# Patient Record
Sex: MALE | Race: BLACK OR AFRICAN AMERICAN | Employment: UNEMPLOYED | URBAN - METROPOLITAN AREA
[De-identification: names, ages, dates, MRNs, and addresses within clinical notes are randomized per-mention and may not be internally consistent; named-entity substitution may affect disease eponyms.]

---

## 2017-03-10 ENCOUNTER — APPOINTMENT (OUTPATIENT)
Dept: INFECTIOUS DISEASES | Facility: CLINIC | Age: 47
End: 2017-03-10
Attending: INTERNAL MEDICINE
Payer: COMMERCIAL

## 2017-03-30 DIAGNOSIS — Z21 ASYMPTOMATIC HUMAN IMMUNODEFICIENCY VIRUS (HIV) INFECTION STATUS (H): ICD-10-CM

## 2017-03-30 RX ORDER — EFAVIRENZ, EMTRICITABINE, AND TENOFOVIR DISOPROXIL FUMARATE 600; 200; 300 MG/1; MG/1; MG/1
TABLET, FILM COATED ORAL
Qty: 30 TABLET | Refills: 5 | Status: SHIPPED | OUTPATIENT
Start: 2017-03-30 | End: 2017-09-25

## 2017-09-20 ENCOUNTER — OFFICE VISIT (OUTPATIENT)
Dept: INFECTIOUS DISEASES | Facility: CLINIC | Age: 47
End: 2017-09-20
Attending: INTERNAL MEDICINE
Payer: COMMERCIAL

## 2017-09-20 VITALS
HEIGHT: 68 IN | DIASTOLIC BLOOD PRESSURE: 74 MMHG | WEIGHT: 188.4 LBS | SYSTOLIC BLOOD PRESSURE: 138 MMHG | TEMPERATURE: 98.7 F | BODY MASS INDEX: 28.55 KG/M2 | HEART RATE: 94 BPM

## 2017-09-20 DIAGNOSIS — L05.91 INFECTED PILONIDAL CYST: ICD-10-CM

## 2017-09-20 DIAGNOSIS — K64.8 INTERNAL HEMORRHOID: ICD-10-CM

## 2017-09-20 DIAGNOSIS — B20 HUMAN IMMUNODEFICIENCY VIRUS (HIV) DISEASE (H): Primary | ICD-10-CM

## 2017-09-20 DIAGNOSIS — Z00.00 HEALTHCARE MAINTENANCE: ICD-10-CM

## 2017-09-20 DIAGNOSIS — B20 HUMAN IMMUNODEFICIENCY VIRUS (HIV) DISEASE (H): ICD-10-CM

## 2017-09-20 DIAGNOSIS — B35.6 TINEA CRURIS: ICD-10-CM

## 2017-09-20 LAB
ALBUMIN SERPL-MCNC: 4.2 G/DL (ref 3.4–5)
ALBUMIN UR-MCNC: NEGATIVE MG/DL
ALP SERPL-CCNC: 99 U/L (ref 40–150)
ALT SERPL W P-5'-P-CCNC: 34 U/L (ref 0–70)
ANION GAP SERPL CALCULATED.3IONS-SCNC: 6 MMOL/L (ref 3–14)
APPEARANCE UR: CLEAR
AST SERPL W P-5'-P-CCNC: 23 U/L (ref 0–45)
BASOPHILS # BLD AUTO: 0 10E9/L (ref 0–0.2)
BASOPHILS NFR BLD AUTO: 0.4 %
BILIRUB SERPL-MCNC: 0.2 MG/DL (ref 0.2–1.3)
BILIRUB UR QL STRIP: NEGATIVE
BUN SERPL-MCNC: 14 MG/DL (ref 7–30)
CALCIUM SERPL-MCNC: 9 MG/DL (ref 8.5–10.1)
CHLORIDE SERPL-SCNC: 106 MMOL/L (ref 94–109)
CO2 SERPL-SCNC: 28 MMOL/L (ref 20–32)
COLOR UR AUTO: YELLOW
CREAT SERPL-MCNC: 0.78 MG/DL (ref 0.66–1.25)
DIFFERENTIAL METHOD BLD: NORMAL
EOSINOPHIL # BLD AUTO: 0.1 10E9/L (ref 0–0.7)
EOSINOPHIL NFR BLD AUTO: 1.1 %
ERYTHROCYTE [DISTWIDTH] IN BLOOD BY AUTOMATED COUNT: 12.6 % (ref 10–15)
GFR SERPL CREATININE-BSD FRML MDRD: >90 ML/MIN/1.7M2
GLUCOSE SERPL-MCNC: 89 MG/DL (ref 70–99)
GLUCOSE UR STRIP-MCNC: NEGATIVE MG/DL
HCT VFR BLD AUTO: 43 % (ref 40–53)
HGB BLD-MCNC: 14.2 G/DL (ref 13.3–17.7)
HGB UR QL STRIP: NEGATIVE
IMM GRANULOCYTES # BLD: 0 10E9/L (ref 0–0.4)
IMM GRANULOCYTES NFR BLD: 0.1 %
KETONES UR STRIP-MCNC: NEGATIVE MG/DL
LEUKOCYTE ESTERASE UR QL STRIP: NEGATIVE
LIPASE SERPL-CCNC: 122 U/L (ref 73–393)
LYMPHOCYTES # BLD AUTO: 3.6 10E9/L (ref 0.8–5.3)
LYMPHOCYTES NFR BLD AUTO: 44.8 %
MCH RBC QN AUTO: 31.1 PG (ref 26.5–33)
MCHC RBC AUTO-ENTMCNC: 33 G/DL (ref 31.5–36.5)
MCV RBC AUTO: 94 FL (ref 78–100)
MONOCYTES # BLD AUTO: 0.6 10E9/L (ref 0–1.3)
MONOCYTES NFR BLD AUTO: 7.2 %
MUCOUS THREADS #/AREA URNS LPF: PRESENT /LPF
NEUTROPHILS # BLD AUTO: 3.7 10E9/L (ref 1.6–8.3)
NEUTROPHILS NFR BLD AUTO: 46.4 %
NITRATE UR QL: NEGATIVE
NRBC # BLD AUTO: 0 10*3/UL
NRBC BLD AUTO-RTO: 0 /100
PH UR STRIP: 5 PH (ref 5–7)
PLATELET # BLD AUTO: 228 10E9/L (ref 150–450)
POTASSIUM SERPL-SCNC: 3.8 MMOL/L (ref 3.4–5.3)
PROT SERPL-MCNC: 8.5 G/DL (ref 6.8–8.8)
RBC # BLD AUTO: 4.56 10E12/L (ref 4.4–5.9)
RBC #/AREA URNS AUTO: 11 /HPF (ref 0–2)
SODIUM SERPL-SCNC: 140 MMOL/L (ref 133–144)
SOURCE: ABNORMAL
SP GR UR STRIP: 1.03 (ref 1–1.03)
SQUAMOUS #/AREA URNS AUTO: <1 /HPF (ref 0–1)
UROBILINOGEN UR STRIP-MCNC: 0 MG/DL (ref 0–2)
WBC # BLD AUTO: 7.9 10E9/L (ref 4–11)
WBC #/AREA URNS AUTO: 2 /HPF (ref 0–2)

## 2017-09-20 PROCEDURE — 87491 CHLMYD TRACH DNA AMP PROBE: CPT | Performed by: INTERNAL MEDICINE

## 2017-09-20 PROCEDURE — 86359 T CELLS TOTAL COUNT: CPT | Performed by: INTERNAL MEDICINE

## 2017-09-20 PROCEDURE — 85025 COMPLETE CBC W/AUTO DIFF WBC: CPT

## 2017-09-20 PROCEDURE — 87591 N.GONORRHOEAE DNA AMP PROB: CPT | Performed by: INTERNAL MEDICINE

## 2017-09-20 PROCEDURE — 80053 COMPREHEN METABOLIC PANEL: CPT

## 2017-09-20 PROCEDURE — 87536 HIV-1 QUANT&REVRSE TRNSCRPJ: CPT | Performed by: INTERNAL MEDICINE

## 2017-09-20 PROCEDURE — 86359 T CELLS TOTAL COUNT: CPT

## 2017-09-20 PROCEDURE — 83690 ASSAY OF LIPASE: CPT

## 2017-09-20 PROCEDURE — 99213 OFFICE O/P EST LOW 20 MIN: CPT | Mod: ZF

## 2017-09-20 PROCEDURE — 81001 URINALYSIS AUTO W/SCOPE: CPT

## 2017-09-20 PROCEDURE — 86360 T CELL ABSOLUTE COUNT/RATIO: CPT | Performed by: INTERNAL MEDICINE

## 2017-09-20 RX ORDER — SULFAMETHOXAZOLE/TRIMETHOPRIM 800-160 MG
1 TABLET ORAL 2 TIMES DAILY
Qty: 14 TABLET | Refills: 0 | Status: SHIPPED | OUTPATIENT
Start: 2017-09-20 | End: 2018-05-30

## 2017-09-20 ASSESSMENT — PAIN SCALES - GENERAL: PAINLEVEL: NO PAIN (0)

## 2017-09-20 NOTE — MR AVS SNAPSHOT
"              After Visit Summary   9/20/2017    Viral Guerin    MRN: [id number]           Patient Information     Date Of Birth          1970        Visit Information        Provider Department      9/20/2017 5:00 PM Phuc Higgins MD M [facility] and Infectious Diseases        Today's Diagnoses     Human immunodeficiency virus (HIV) disease (H)    -  1    Infected pilonidal cyst          Care Instructions    - For the bright red blood in your stool, I suspect you have a mild internal rectal abrasion or an early internal hemorrhoid from constipation.  Take Metamucil (or generic version) twice daily along with lots of fluid to increase your stool frequency and fiber content to reduce the constipation and rectal irritation.  - Let me know if the blood in the stools persists more than a couple of weeks.  - For the groin rash (\"jock itch\", tinea cruris), use over-the-counter antifungal powder (clotrimazole or miconazole) once or twice daily as needed.  For the infected pilonidal cyst on your buttocks, take the Bactrim (trimethoprim-sulfamethoxazole) antibiotic one pill twice daily for a week.  If the cyst returns again later, then you may need to go to the Rectal Surgery Clinic to have it excised.  - Continue to work on the slow weight loss -- way to go!          Follow-ups after your visit        Follow-up notes from your care team     Return in about 1 year (around 9/20/2018).      Future tests that were ordered for you today     Open Future Orders        Priority Expected Expires Ordered    Routine UA with micro - Reflex to culture Routine  9/20/2018 9/20/2017    Comprehensive metabolic panel Routine  9/20/2018 9/20/2017    CBC with platelets differential Routine  9/20/2018 9/20/2017    HIV-1 RNA quantitative Routine  9/20/2018 9/20/2017    T cell subset profile Routine  9/20/2018 9/20/2017    Chlamydia trachomatis PCR Routine  9/20/2018 9/20/2017    Neisseria gonorrhoeae PCR Routine  9/20/2018 " "2017    Lipase Routine  2018            Who to contact     If you have questions or need follow up information about today's clinic visit or your schedule please contact [facility] INFECTI[facility] directly at [phone].  Normal or non-critical lab and imaging results will be communicated to you by MyChart, letter or phone within 4 business days after the clinic has received the results. If you do not hear from us within 7 days, please contact the clinic through MyChart or phone. If you have a critical or abnormal lab result, we will notify you by phone as soon as possible.  Submit refill requests through [employer] or call your pharmacy and they will forward the refill request to us. Please allow 3 business days for your refill to be completed.          Additional Information About Your Visit        MyChart Information     [employer] lets you send messages to your doctor, view your test results, renew your prescriptions, schedule appointments and more. To sign up, go to [email or web address] . Click on \"Log in\" on the left side of the screen, which will take you to the Welcome page. Then click on \"Sign up Now\" on the right side of the page.     You will be asked to enter the access code listed below, as well as some personal information. Please follow the directions to create your username and password.     Your access code is: [email or web address]  Expires: 2017  6:30 AM     Your access code will  in 90 days. If you need help or a new code, please call your [facility] clinic or [phone].        Care EveryWhere ID     This is your Care EveryWhere ID. This could be used by other organizations to access your [facility] medical records  [id number]        Your Vitals Were     Pulse Temperature Height BMI (Body Mass Index)          94 98.7  F (37.1  C) (Oral) 1.715 m (5' 7.5\") 29.07 kg/m2         Blood Pressure from Last 3 Encounters:   17 138/74   16 116/76 "   05/16/16 119/76    Weight from Last 3 Encounters:   09/20/17 85.5 kg (188 lb 6.4 oz)   11/16/16 90.9 kg (200 lb 6.4 oz)   05/16/16 89.3 kg (196 lb 14.4 oz)                 Today's Medication Changes          These changes are accurate as of: 9/20/17  6:03 PM.  If you have any questions, ask your nurse or doctor.               Start taking these medicines.        Dose/Directions    sulfamethoxazole-trimethoprim 800-160 MG per tablet   Commonly known as:  BACTRIM DS   Used for:  Infected pilonidal cyst   Started by:  Phuc Higgins MD        Dose:  1 tablet   Take 1 tablet by mouth 2 times daily   Quantity:  14 tablet   Refills:  0         Stop taking these medicines if you haven't already. Please contact your care team if you have questions.     diphenhydrAMINE 25 MG capsule   Commonly known as:  BENADRYL   Stopped by:  Phuc Higgins MD                Where to get your medicines      These medications were sent to [address]  [address] [place] [address] [address] [place] [id number]    Hours:  TRANSPLANT PHONE NUMBER [phone] Phone:  [phone]     sulfamethoxazole-trimethoprim 800-160 MG per tablet                Primary Care Provider    None Doctor, MD       No address on file        Equal Access to Services     CANDI GOINS AH: Hadii mauricio cuevas hadasho Soomaali, waaxda luqadaha, qaybta kaalmada adeegyada, heidy chamberlain haylaura welsh. So [facility] [phone].    ATENCIÓN: Si habla español, tiene a celis disposición servicios gratuitos de asistencia lingüística. Llame al [phone].    We comply with applicable federal civil rights laws and [place] laws. We do not discriminate on the basis of race, color, national origin, age, disability sex, sexual orientation or gender identity.            Thank you!     Thank you for choosing [facility] INFECT[facility]  for your care. Our goal is always to provide you with  excellent care. Hearing back from our patients is one way we can continue to improve our services. Please take a few minutes to complete the written survey that you may receive in the mail after your visit with us. Thank you!             Your Updated Medication List - Protect others around you: Learn how to safely use, store and throw away your medicines at www.disposemymeds.org.          This list is accurate as of: 9/20/17  6:03 PM.  Always use your most recent med list.                   Brand Name Dispense Instructions for use Diagnosis    ATRIPLA 600-200-300 MG per tablet   Generic drug:  efavirenz-emtrictabine-tenofovir     30 tablet    TAKE ONE TABLET BY MOUTH AT BEDTIME.    Asymptomatic human immunodeficiency virus (HIV) infection status (H)       ONE-A-DAY MENS Tabs      Take  by mouth daily.    Human immunodeficiency virus (HIV) disease (H)       sulfamethoxazole-trimethoprim 800-160 MG per tablet    BACTRIM DS    14 tablet    Take 1 tablet by mouth 2 times daily    Infected pilonidal cyst

## 2017-09-20 NOTE — NURSING NOTE
"Chief Complaint   Patient presents with     RECHECK     follow up with B20, tzimmer cma       Initial /74  Pulse 94  Temp 98.7  F (37.1  C) (Oral)  Ht 1.715 m (5' 7.5\")  Wt 85.5 kg (188 lb 6.4 oz)  BMI 29.07 kg/m2 Estimated body mass index is 29.07 kg/(m^2) as calculated from the following:    Height as of this encounter: 1.715 m (5' 7.5\").    Weight as of this encounter: 85.5 kg (188 lb 6.4 oz).  Medication Reconciliation: complete    "

## 2017-09-20 NOTE — PATIENT INSTRUCTIONS
"- For the bright red blood in your stool, I suspect you have a mild internal rectal abrasion or an early internal hemorrhoid from constipation.  Take Metamucil (or generic version) twice daily along with lots of fluid to increase your stool frequency and fiber content to reduce the constipation and rectal irritation.  - Let me know if the blood in the stools persists more than a couple of weeks.  - For the groin rash (\"jock itch\", tinea cruris), use over-the-counter antifungal powder (clotrimazole or miconazole) once or twice daily as needed.  For the infected pilonidal cyst on your buttocks, take the Bactrim (trimethoprim-sulfamethoxazole) antibiotic one pill twice daily for a week.  If the cyst returns again later, then you may need to go to the Rectal Surgery Clinic to have it excised.  - Continue to work on the slow weight loss -- way to go!  "

## 2017-09-21 LAB
C TRACH DNA SPEC QL NAA+PROBE: POSITIVE
CD3 CELLS # BLD: 2462 CELLS/UL (ref 603–2990)
CD3 CELLS NFR BLD: 64 % (ref 49–84)
CD3+CD4+ CELLS # BLD: 798 CELLS/UL (ref 441–2156)
CD3+CD4+ CELLS NFR BLD: 21 % (ref 28–63)
CD3+CD4+ CELLS/CD3+CD8+ CLL BLD: 0.5 % (ref 1.4–2.6)
CD3+CD8+ CELLS # BLD: 1605 CELLS/UL (ref 125–1312)
CD3+CD8+ CELLS NFR BLD: 42 % (ref 10–40)
IFC SPECIMEN: ABNORMAL
N GONORRHOEA DNA SPEC QL NAA+PROBE: NEGATIVE
SPECIMEN SOURCE: ABNORMAL
SPECIMEN SOURCE: NORMAL

## 2017-09-22 LAB
HIV1 RNA # PLAS NAA DL=20: <20 {COPIES}/ML
HIV1 RNA SERPL NAA+PROBE-LOG#: <1.3 {LOG_COPIES}/ML

## 2017-09-22 NOTE — PROGRESS NOTES
Submitted case report form to MD for chlamydia lab positive collected on 09/20/2017.  Santa Dawn 9/22/2017   [facility] Infection Prevention  [phone]

## 2017-09-25 DIAGNOSIS — Z21 ASYMPTOMATIC HUMAN IMMUNODEFICIENCY VIRUS (HIV) INFECTION STATUS (H): ICD-10-CM

## 2017-09-28 NOTE — PROGRESS NOTES
"  Division of Infectious Diseases and International Medicine  Department of Medicine    [facility] FOLLOW-UP VISIT NOTE [place] Mail [phone] [place] [address]  Office: [phone]  Fax:  [phone]     HISTORY OF PRESENT ILLNESS:    Viral Guerin is a very pleasant 46 year old gentleman with asymptomatic HIV infection (initially diagnosed in 6/10).  He returns today to [facility] for ~ annual follow-up of his antiretroviral therapy with Atripla one tablet PO qHS (started in 7/10).  He continues to be proud that he has not missed a single dose of Atripla since starting it in 2010.  He experiences no drug side effects.  He generally feels quite well.  He sleeps well at night and has good energy and appetite.  He continues to do fifty sit ups each morning, but still is not performing much in the way of other routine aerobic exercise.  He has managed to watch his diet and lose more than ten pounds, however.  His prior right axillary cyst resolved around the beginning of this year.  He is not aware of any STD exposures over the past year but is willing to undergo an STI screening (has engages only in insertive intercourse so does not feel he needs oral / rectal screening).  He has not smoked any cigarettes for the past ~ 1.5 years+.  He has noticed some scrotal / groin erythema and chafing of late.  He also complains that the previously noted left lower gluteal cleft tender lesion that was present off and on in 10 - 11/16 and resolved after a ten day course of TMP-SMX antibiotic therapy (for possible diagnosis of Staph aureus furuncle), but has now recurred again over the past two weeks.  About 1.5 weeks ago it was very swollen and tender to touch or sitting, but then seemed to \"pop\" open and drain a small amount of thin fluid and has since involuted some and is less tender.  He otherwise feels healthy of late and reports no recent fever, chills, or new night sweats " "(beyond baseline mild lifelong occasional sweats at night), rash, EENT symptoms, chest pain, dyspnea, cough, nausea, abdominal pain, diarrhea, myalgias / arthralgias, dysuria, other genitourinary symptoms, headache, or other neurological symptoms.    PAST MEDICAL HISTORY:  Past Medical History:   Diagnosis Date     Childhood asthma     Resolved.     Finger fracture, left ~ 1991.     Human immunodeficiency virus (HIV) disease (H) Diagnosed 6/11/10    CD4+ cell count never below 200, no AIDS sequelae.  Started therapy with Atripla 7/2/10.     CURRENT MEDICATIONS:  Current Outpatient Prescriptions   Medication Sig Dispense Refill     sulfamethoxazole-trimethoprim (BACTRIM DS) 800-160 MG per tablet Take 1 tablet by mouth 2 times daily 14 tablet 0     ATRIPLA 600-200-300 MG per tablet TAKE ONE TABLET BY MOUTH AT BEDTIME. 30 tablet 5     Multiple Vitamin (ONE-A-DAY MENS) TABS Take  by mouth daily.       SOCIAL HISTORY:  Social History     Social History     Marital status: Single     Spouse name: N/A     Number of children: N/A     Years of education: N/A     Occupational History     Not on file.     Social History Main Topics     Smoking status: Former Smoker     Packs/day: 0.30     Types: Cigarettes     Smokeless tobacco: Never Used     Alcohol use Yes      Comment: Wine daily.     Drug use: No     Sexual activity: Not on file     Other Topics Concern     Not on file     Social History Narrative    Citizen of [place], moved to [place] in early 2010.  Lives in [place].  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   Sexually active with a female partner and practices safe sex with a condom.  He performs push-ups for exercise each morning and evening.  He now also goes to a gym twice a week and [place] [facility] [place].  Quit smoking ~ early 2016.    FAMILY HISTORY:  Family History   Problem Relation Age of Onset     Hypertension Other      REVIEW OF " "SYMPTOMS:  As per HPI.  Complete ROS is otherwise negative.    PHYSICAL EXAMINATION:  General:  A personable, articulate, upbeat, WDWN 46 year old man in no discomfort.  Vital signs:  /74  Pulse 94  Temp 98.7  F (37.1  C) (Oral)  Ht 1.715 m (5' 7.5\")  Wt 85.5 kg (188 lb 6.4 oz)  BMI 29.07 kg/m2 (weight decreased twleve pounds versus 11/16).  Skin:  No new rash or lesion.  Head / Neck:  NCAT. External auditory canals are patent without otorrhea.  PERRL.  EOMI.  Conjunctivae are pink without injection.  Sclerae are white.  Oropharynx lacks erythema, exudate, or lesion.  Dentition is in good repair.  Neck is supple without lymphadenopathy or thyromegaly.  Lungs:  Bilaterally clear to auscultation.  CV: RRR.  Normal S1, S2 without gallop, murmur or rub.  Abdomen: Bowel sounds active, soft, nontender, no hepatosplenomegaly.  Back:  No low back or CVA tenderness.  :  Normal external male genitalia, testes descended bilaterally.  There is a mild diffuse scrotal and inguinal tinea cruris erythema with a few satellite lesions present bilaterally.  Rectal:  There is a small (~ 2 cm), involuted, healing (post-drainage) probable pilonidal cyst at about 7:00 below to anus in the left inferior approximating gluteal cleft that is mildly tender to palpation and slightly erythematous but lacks significant signs of inflammation.  There are no other rectal or perineal lesions visible.  Extremities:  Distally warm, no edema.  Neuro: Alert, oriented, memory/cognition/ affect normal.  Gait is normal.    LABORATORY STUDIES (Past results reviewed with the patient during his clinic appointment today):      Color Urine 09/20/2017 Yellow   Final     Appearance Urine 09/20/2017 Clear   Final     Glucose Urine 09/20/2017 Negative  NEG^Negative mg/dL Final     Bilirubin Urine 09/20/2017 Negative  NEG^Negative Final     Ketones Urine 09/20/2017 Negative  NEG^Negative mg/dL Final     Specific Gravity Urine 09/20/2017 1.033  1.003 - " 1.035 Final     Blood Urine 09/20/2017 Negative  NEG^Negative Final     pH Urine 09/20/2017 5.0  5.0 - 7.0 pH Final     Protein Albumin Urine 09/20/2017 Negative  NEG^Negative mg/dL Final     Urobilinogen mg/dL 09/20/2017 0.0  0.0 - 2.0 mg/dL Final     Nitrite Urine 09/20/2017 Negative  NEG^Negative Final     Leukocyte Esterase Urine 09/20/2017 Negative  NEG^Negative Final     Source 09/20/2017 Midstream Urine   Final     WBC Urine 09/20/2017 2  0 - 2 /HPF Final     RBC Urine 09/20/2017 11* 0 - 2 /HPF Final     Squamous Epithelial /HPF Urine 09/20/2017 <1  0 - 1 /HPF Final     Mucous Urine 09/20/2017 Present* NEG^Negative /LPF Final     Sodium 09/20/2017 140  133 - 144 mmol/L Final     Potassium 09/20/2017 3.8  3.4 - 5.3 mmol/L Final     Chloride 09/20/2017 106  94 - 109 mmol/L Final     Carbon Dioxide 09/20/2017 28  20 - 32 mmol/L Final     Anion Gap 09/20/2017 6  3 - 14 mmol/L Final     Glucose 09/20/2017 89  70 - 99 mg/dL Final     Urea Nitrogen 09/20/2017 14  7 - 30 mg/dL Final     Creatinine 09/20/2017 0.78  0.66 - 1.25 mg/dL Final     GFR Estimate 09/20/2017 >90  >60 mL/min/1.7m2 Final    Non  GFR Calc     GFR Estimate If Black 09/20/2017 >90  >60 mL/min/1.7m2 Final    African American GFR Calc     Calcium 09/20/2017 9.0  8.5 - 10.1 mg/dL Final     Bilirubin Total 09/20/2017 0.2  0.2 - 1.3 mg/dL Final     Albumin 09/20/2017 4.2  3.4 - 5.0 g/dL Final     Protein Total 09/20/2017 8.5  6.8 - 8.8 g/dL Final     Alkaline Phosphatase 09/20/2017 99  40 - 150 U/L Final     ALT 09/20/2017 34  0 - 70 U/L Final     AST 09/20/2017 23  0 - 45 U/L Final     WBC 09/20/2017 7.9  4.0 - 11.0 10e9/L Final     RBC Count 09/20/2017 4.56  4.4 - 5.9 10e12/L Final     Hemoglobin 09/20/2017 14.2  13.3 - 17.7 g/dL Final     Hematocrit 09/20/2017 43.0  40.0 - 53.0 % Final     MCV 09/20/2017 94  78 - 100 fl Final     MCH 09/20/2017 31.1  26.5 - 33.0 pg Final     MCHC 09/20/2017 33.0  31.5 - 36.5 g/dL Final     RDW  09/20/2017 12.6  10.0 - 15.0 % Final     Platelet Count 09/20/2017 228  150 - 450 10e9/L Final     Diff Method 09/20/2017 Automated Method   Final     % Neutrophils 09/20/2017 46.4  % Final     % Lymphocytes 09/20/2017 44.8  % Final     % Monocytes 09/20/2017 7.2  % Final     % Eosinophils 09/20/2017 1.1  % Final     % Basophils 09/20/2017 0.4  % Final     % Immature Granulocytes 09/20/2017 0.1  % Final     Nucleated RBCs 09/20/2017 0  0 /100 Final     Absolute Neutrophil 09/20/2017 3.7  1.6 - 8.3 10e9/L Final     Absolute Lymphocytes 09/20/2017 3.6  0.8 - 5.3 10e9/L Final     Absolute Monocytes 09/20/2017 0.6  0.0 - 1.3 10e9/L Final     Absolute Eosinophils 09/20/2017 0.1  0.0 - 0.7 10e9/L Final     Absolute Basophils 09/20/2017 0.0  0.0 - 0.2 10e9/L Final     Abs Immature Granulocytes 09/20/2017 0.0  0 - 0.4 10e9/L Final     Absolute Nucleated RBC 09/20/2017 0.0   Final     HIV-1 RNA Quant Result 09/20/2017 <20* HIVND^HIV-1 RNA Not Detected [Copies]/mL Final    Comment: HIV-1 RNA Detected, less than 20 HIV-1 RNA copies/mL  The [employer] AmpliPrep/ERIKA TaqMan HIV-1 test is an FDA-approved in vitro   nucleic acid amplification test for the quantitation of HIV-1 RNA in human   plasma (EDTA plasma) using the [employer]KA AmpliPrep instrument for automated viral   nucleic acid extraction and the E[employer]KA TaqMan Analyzer or ERIKA TaqMan for   automated Real Time PCR amplification and detection of the viral nucleic acid   target.  Titer results are reported in copies/ml. This assay is intended for use in   conjunction with clinical presentation and other laboratory markers of disease   prognosis and for use as an aid in assessing viral response to antiretroviral   treatment as measured by changes in plasma HIV-1 RNA levels. This test should   not be used as a donor screening test to confirm the presence of HIV-1   infection.       HIV RNA QT Log 09/20/2017 <1.3  <1.3 [Log_copies]/mL Final     IFC Specimen 09/20/2017 Blood   Final      CD3 Mature T 09/20/2017 64  49 - 84 % Final     CD4 [place] T 09/20/2017 21* 28 - 63 % Final     CD8 Suppressor T 09/20/2017 42* 10 - 40 % Final     CD4:CD8 Ratio 09/20/2017 0.50* 1.40 - 2.60 Final     Absolute CD3 09/20/2017 2462  603 - 2990 cells/uL Final     Absolute CD4 09/20/2017 798  441 - 2156 cells/uL Final     Absolute CD8 09/20/2017 1605* 125 - 1312 cells/uL Final     Specimen Description 09/20/2017 Urine   Final     Chlamydia Trachomatis PCR 09/20/2017 Positive* NEG^Negative Final    Comment: Positive for C. trachomatis rRNA by transcription mediated amplification.  As is true for all non-culture methods, a positive specimen obtained from a   patient after therapeutic treatment cannot be interpreted as indicating the   presence of viable C. trachomatis.  Significant Value faxed/printed to  [facility] [phone] AT 1215 ON 09.21.17 ETK.       Specimen Descrip 09/20/2017 Urine   Final     N Gonorrhea PCR 09/20/2017 Negative  NEG^Negative Final    Comment: Negative for N. gonorrhoeae rRNA by transcription mediated amplification.  A negative result by transcription mediated amplification does not preclude   the presence of N. gonorrhoeae infection because results are dependent on   proper and adequate collection, absence of inhibitors, and sufficient rRNA to   be detected.       Lipase 09/20/2017 122  73 - 393 U/L Final     6/28/11:  Fasting total cholesterol 164, triglycerides 180, LDL 88, HDL 40.  HAV / ABV / HCV seronegative, antitreponemal antibody negative, toxoplasmosis IgG negative.  6/17/10  HIV susceptibility Rafiugene genotype (protocol screening) showed no primary reverse transcriptase or protease mutations.   6/11/10 HIV EIA screen and Western blot (all bands reactive) positive at a [facility].        IMPRESSION AND PLAN:  1. Asymptomatic HIV infection:  Stable on continued Atripla, which he takes and tolerates exceptionally well, his absolute CD4+ cell count is now consistently  above 700 over the past two years with a persistently undetectable HIV plasma viral load.  We discussed that other antiretroviral combination pills are available, but he is content with Atripla and has no real reason to switch.  His serum creatinine remains normal.  He will continue taking Atripla and return to clinic for follow-up in one year, or as needed before then.  2. Drained, involuted, probable left pilonidal cyst:  This tender lesion occurred in exactly the same location in ~ 10/16 and was treated as a possible Staph aureus furuncle with a ten day course of TMP-SMX on 11/16/16 with resolution, but now has recurred.  This time, it came to a head, burst, and self-drained about a week+ ago, but still has some mild inflammation at the site.  This history and exam is now strongly suggestive of a pilonidal cyst -- we discussed that this may require surgery for definitive cure.  Since it resolved and stayed resolved for nearly a year after an antibiotic course in 11/16, Mr. Guerin is reluctant to take a referral to Surgery Clinic at this time -- he wants to see if a course of antibiotics once again resolves the problem, even though I advised him that it is eventually likely to return to a very painful state sooner or late.  In keeping with his wishes, we will again treat now with a course of TMP-SMX DS one PO BID for a week.  If the lesion recurs after that, he agrees to phone me an then accept a referral to Colorectal Clinic.  3. Probable internal hemorrhoid:  He has had mild painless stool-spotting BRBPR.  Recommended that he increase fiber (dietary and Metamucil or equivalent) and fluid intake.  He should phone me if the bleeding does not fully resolve within a couple of weeks.  4. Tinea cruris:  I recommended treatment with topical OTC clotrimazole 1% powder BID for 1 - 2 weeks as well as more air exposure.  5. Obesity:  Improving slowly.  Recommended routine aerobic exercise.  6. History of past urinary  Chlamydia infection:  Treated with azithromycin / IM ceftriaxone here on 6/22/15; subsequent follow-up test-of-cure assays were negative.  Has not had symptoms and he reports no likely re-exposures (although in 6/15 he was puzzled how he might have been exposed, so perhaps was having dissociative sexual encounters), but he is due today for repeat STD screening.  He has only insertive intercourse, so rectal screening is unneeded.  7. Health Care Maintenance:   Influenza vaccine given 11/16/16 -- will return for that later this autumn (not yet available in clinic today).  Menactra vaccine given 11/25/15.  Received Tdap 2/12/14.  Received a Prevnar 13 vaccine 2/12/14 and Pneumovax on 8/13/14.  HAV / HBV sero-immune on 6/18/15 post-vaccination.  HCV seronegative 6/28/11 and 5/16/16.  3/21/12 Quantiferon Gold assay negative.  Antitreponemal antibody screen negative most recently on 5/16/16 -- repeat at next blood draw.  7/18/12 fasting lipids were good with an LDL of 58 -- repeat at next blood draw.  Will check a routine urinalysis today.  Underwent a right lower molar dental extraction in ~ 4/15; up to date with a dentist.  No cigarettes smoked for > 1.5 years now.

## 2017-09-29 RX ORDER — EFAVIRENZ, EMTRICITABINE, AND TENOFOVIR DISOPROXIL FUMARATE 600; 200; 300 MG/1; MG/1; MG/1
TABLET, FILM COATED ORAL
Qty: 30 TABLET | Refills: 11 | Status: SHIPPED | OUTPATIENT
Start: 2017-09-29 | End: 2018-06-12 | Stop reason: ALTCHOICE

## 2017-10-03 DIAGNOSIS — Z20.2 EXPOSURE TO STD: ICD-10-CM

## 2017-10-03 DIAGNOSIS — A74.9 CHLAMYDIA INFECTION: Primary | ICD-10-CM

## 2017-10-03 RX ORDER — AZITHROMYCIN 500 MG/1
1000 TABLET, FILM COATED ORAL ONCE
Qty: 2 TABLET | Refills: 0 | Status: SHIPPED | OUTPATIENT
Start: 2017-10-03 | End: 2017-10-03

## 2017-10-03 NOTE — PROGRESS NOTES
"I phoned Mr. Guerin today to inform him that his 9/20/17 routine urine STD screening assay was again positive for Chlamydia (which he had previously once in 2015).  He is uncertain of whether / how he could have been re-exposed.  His GC screen was negative.  We will treat with azithromycin 1 gram PO x 1 -- he will come in to  the prescription at the [facility] within the next two days.  He will return for a \"test-of-cure\" urine STD screen in two to three months (ordered today).  I reported to him that the remainder of his 9/20/17 labs were excellent.  "

## 2018-01-22 ENCOUNTER — TELEPHONE (OUTPATIENT)
Dept: PHARMACY | Facility: CLINIC | Age: 48
End: 2018-01-22

## 2018-01-22 NOTE — TELEPHONE ENCOUNTER
Called patient for refill reminder.    Will mail prescriptions address has been verified.   Delicia Laureano, [facility] Pharmacist.   [phone]

## 2018-02-23 ENCOUNTER — TELEPHONE (OUTPATIENT)
Dept: PHARMACY | Facility: CLINIC | Age: 48
End: 2018-02-23

## 2018-02-23 NOTE — TELEPHONE ENCOUNTER
Called patient for refill reminder.    Will mail 1 prescriptions address has been verified.     Follow up: 03/21/18    Marlene Calzada, [facility]  [phone]

## 2018-03-23 ENCOUNTER — TELEPHONE (OUTPATIENT)
Dept: PHARMACY | Facility: CLINIC | Age: 48
End: 2018-03-23

## 2018-03-23 NOTE — TELEPHONE ENCOUNTER
Called patient for refill reminder.    Will mail 1 prescriptions address has been verified.     6 month of on time refill.    Follow up: 04/23/18    Marlene Calzada, [facility]  [phone]

## 2018-03-23 NOTE — TELEPHONE ENCOUNTER
"Clinical Consult:                                                    Viral Guerin is a 47 year old male called for a refill reminder on medication    We discussed \"new improved tenofovir AF\" and benefits for safety profile on kidney and bone. (Odefsy). Pt is interested in talking further to Dr. Higgins about this. Pt reports he has never missed one dose of Atripla since starting. Reports he is interested in the injectable HIV medication when it becomes available.       Plan:  1. Will mail out Atripla refill today.   2. Will have a  call pt for appt with Dr. Higgins.       Pt is asked to call with any questions/concerns,    Delicia Laureano, [facility] Pharmacist.   [phone]      "

## 2018-03-27 ENCOUNTER — TELEPHONE (OUTPATIENT)
Dept: PHARMACY | Facility: CLINIC | Age: 48
End: 2018-03-27

## 2018-03-27 NOTE — TELEPHONE ENCOUNTER
Called patient for refill reminder.    Patient will   1 prescriptions on 03/27/18    6 month of on time refill.    Follow up: 04/24/18    Marlene Calzada, [facility]  [phone]

## 2018-04-16 ENCOUNTER — ALLIED HEALTH/NURSE VISIT (OUTPATIENT)
Dept: INFECTIOUS DISEASES | Facility: CLINIC | Age: 48
End: 2018-04-16
Payer: COMMERCIAL

## 2018-04-16 DIAGNOSIS — J11.1 INFLUENZA-LIKE ILLNESS: Primary | ICD-10-CM

## 2018-04-16 DIAGNOSIS — J11.1 INFLUENZA-LIKE ILLNESS: ICD-10-CM

## 2018-04-16 DIAGNOSIS — R06.2 WHEEZING: ICD-10-CM

## 2018-04-16 LAB
FLUAV+FLUBV RNA SPEC QL NAA+PROBE: NEGATIVE
FLUAV+FLUBV RNA SPEC QL NAA+PROBE: NEGATIVE
RSV RNA SPEC NAA+PROBE: NEGATIVE
SPECIMEN SOURCE: NORMAL

## 2018-04-16 PROCEDURE — 87631 RESP VIRUS 3-5 TARGETS: CPT | Performed by: INTERNAL MEDICINE

## 2018-04-16 RX ORDER — ALBUTEROL SULFATE 90 UG/1
2 AEROSOL, METERED RESPIRATORY (INHALATION) EVERY 4 HOURS PRN
Qty: 1 INHALER | Refills: 0 | Status: SHIPPED | OUTPATIENT
Start: 2018-04-16 | End: 2021-10-27

## 2018-04-16 NOTE — PROGRESS NOTES
[facility] MD Note:  Mr. Guerin walked in to the [facility] this late morning without an appointment, hoping he could be seen for an acute condition.  He is prone to health anxiety but has been generally quite healthy over recent years and has not needed any follow-up or interventions since his most recent past 9/17/18 routine clinic appointment with me.  He says he has been healthy until yesterday.  Last evening, he developed acute fever (101 degrees) with chills, diffuse arthralgias, dry cough, mild audible wheezing (which he has been prone to in the distant past with URIs), mild intermittent holocranial headaches, and general malaise, all developing over the past ~ 12 hours.  He has some mild rhinorrhea, but lacks sinus pressure, otalgia, sore throat, other EENT symptoms, neck pain or stiffness, resting dyspnea, nausea, emesis, diarrhea, abdominal pain, rash or other skin changes, dysuria, or other new symptoms (as yet).  Per the clinic staff, he does not appear toxic and is fully ambulatory and conversant.    I am unable to get over to the clinic to see him in person now, but spoke with him by phone while he was in the clinic to get the above history.  I told him that, based on the above, he seems most likely to have an influenza-like illness which may be due to influenza or another acute community-acquired respiratory virus.  He has a normal absolute CD4+ cell count so has a basically normal immune capability to eventually fight off such an infection.  We will check a nares RSV / influenza PCR assay (ordered, done in clinic) and if it is positive, I will send a prescription for five days of oseltamivir to his local pharmacy.  In addition, he will resume using an albuterol inhaler QID for a few days as needed if that makes him feel better (presecription sent to our [facility]).  We discussed that this URI syndrome may get somewhat worse over the next couple of days before it  gets better, that it may last for up to a week, that he may take OTC acetaminophen / ibuprofen / cough syrup / cold-sleep aids as needed for discomfort and nocturnal cough suppression, that he should get plenty of rest and drink plenty of fluids and get as much nutrition as possible, and other home self-care measures.  He should phone us or be seen locally if his symptoms worsen dramatically.  He seemed to fully understand and appreciate this phone conversation.  He is encouraged to contact us with any additional questions or concerns.

## 2018-04-25 ENCOUNTER — TELEPHONE (OUTPATIENT)
Dept: PHARMACY | Facility: CLINIC | Age: 48
End: 2018-04-25

## 2018-04-25 NOTE — TELEPHONE ENCOUNTER
Pt called to order refill.   Patient will   1 prescriptions on 4/25/18    6 month of on time refill.    Follow-up Date: 05/23/18    Marlene Calzada [facility]  [phone]

## 2018-05-23 ENCOUNTER — TELEPHONE (OUTPATIENT)
Dept: PHARMACY | Facility: CLINIC | Age: 48
End: 2018-05-23

## 2018-05-23 NOTE — TELEPHONE ENCOUNTER
Called patient for refill reminder.    Patient will   1 prescriptions on 5/24/18 at [facility]    4 months of on time refill.    Follow-up Date: 6/20/18    Nelly Heard [facility]  [phone]

## 2018-05-29 ENCOUNTER — TELEPHONE (OUTPATIENT)
Dept: INFECTIOUS DISEASES | Facility: CLINIC | Age: 48
End: 2018-05-29

## 2018-05-29 NOTE — TELEPHONE ENCOUNTER
Spoke with pt via phone and confirmed appt with Dr. Higgins for tomorrow, 5/30.  Mya Aggarwal RN

## 2018-05-30 ENCOUNTER — OFFICE VISIT (OUTPATIENT)
Dept: INFECTIOUS DISEASES | Facility: CLINIC | Age: 48
End: 2018-05-30
Attending: INTERNAL MEDICINE
Payer: COMMERCIAL

## 2018-05-30 VITALS
BODY MASS INDEX: 27.58 KG/M2 | SYSTOLIC BLOOD PRESSURE: 126 MMHG | DIASTOLIC BLOOD PRESSURE: 77 MMHG | HEART RATE: 91 BPM | WEIGHT: 182 LBS | HEIGHT: 68 IN | TEMPERATURE: 98.3 F

## 2018-05-30 DIAGNOSIS — Z86.19 HISTORY OF CHLAMYDIA INFECTION: ICD-10-CM

## 2018-05-30 DIAGNOSIS — Z21 HUMAN IMMUNODEFICIENCY VIRUS I INFECTION (H): Primary | ICD-10-CM

## 2018-05-30 DIAGNOSIS — Z00.00 HEALTHCARE MAINTENANCE: ICD-10-CM

## 2018-05-30 DIAGNOSIS — B20 HUMAN IMMUNODEFICIENCY VIRUS (HIV) DISEASE (H): ICD-10-CM

## 2018-05-30 LAB
ALBUMIN SERPL-MCNC: 3.8 G/DL (ref 3.4–5)
ALP SERPL-CCNC: 94 U/L (ref 40–150)
ALT SERPL W P-5'-P-CCNC: 28 U/L (ref 0–70)
ANION GAP SERPL CALCULATED.3IONS-SCNC: 8 MMOL/L (ref 3–14)
AST SERPL W P-5'-P-CCNC: 23 U/L (ref 0–45)
BASOPHILS # BLD AUTO: 0 10E9/L (ref 0–0.2)
BASOPHILS NFR BLD AUTO: 0.3 %
BILIRUB SERPL-MCNC: 0.2 MG/DL (ref 0.2–1.3)
BUN SERPL-MCNC: 10 MG/DL (ref 7–30)
CALCIUM SERPL-MCNC: 8.9 MG/DL (ref 8.5–10.1)
CHLORIDE SERPL-SCNC: 108 MMOL/L (ref 94–109)
CHOLEST SERPL-MCNC: 138 MG/DL
CO2 SERPL-SCNC: 24 MMOL/L (ref 20–32)
CREAT SERPL-MCNC: 0.81 MG/DL (ref 0.66–1.25)
DIFFERENTIAL METHOD BLD: ABNORMAL
EOSINOPHIL # BLD AUTO: 0.1 10E9/L (ref 0–0.7)
EOSINOPHIL NFR BLD AUTO: 0.9 %
ERYTHROCYTE [DISTWIDTH] IN BLOOD BY AUTOMATED COUNT: 12.9 % (ref 10–15)
GFR SERPL CREATININE-BSD FRML MDRD: >90 ML/MIN/1.7M2
GLUCOSE SERPL-MCNC: 92 MG/DL (ref 70–99)
HCT VFR BLD AUTO: 40.2 % (ref 40–53)
HDLC SERPL-MCNC: 39 MG/DL
HGB BLD-MCNC: 13.9 G/DL (ref 13.3–17.7)
IMM GRANULOCYTES # BLD: 0 10E9/L (ref 0–0.4)
IMM GRANULOCYTES NFR BLD: 0.2 %
LDLC SERPL CALC-MCNC: 76 MG/DL
LIPASE SERPL-CCNC: 126 U/L (ref 73–393)
LYMPHOCYTES # BLD AUTO: 3.5 10E9/L (ref 0.8–5.3)
LYMPHOCYTES NFR BLD AUTO: 37.9 %
MCH RBC QN AUTO: 32 PG (ref 26.5–33)
MCHC RBC AUTO-ENTMCNC: 34.6 G/DL (ref 31.5–36.5)
MCV RBC AUTO: 92 FL (ref 78–100)
MONOCYTES # BLD AUTO: 0.5 10E9/L (ref 0–1.3)
MONOCYTES NFR BLD AUTO: 5.5 %
NEUTROPHILS # BLD AUTO: 5.1 10E9/L (ref 1.6–8.3)
NEUTROPHILS NFR BLD AUTO: 55.2 %
NONHDLC SERPL-MCNC: 99 MG/DL
NRBC # BLD AUTO: 0 10*3/UL
NRBC BLD AUTO-RTO: 0 /100
PLATELET # BLD AUTO: 219 10E9/L (ref 150–450)
POTASSIUM SERPL-SCNC: 3.6 MMOL/L (ref 3.4–5.3)
PROT SERPL-MCNC: 7.6 G/DL (ref 6.8–8.8)
RBC # BLD AUTO: 4.35 10E12/L (ref 4.4–5.9)
SODIUM SERPL-SCNC: 140 MMOL/L (ref 133–144)
TRIGL SERPL-MCNC: 111 MG/DL
WBC # BLD AUTO: 9.2 10E9/L (ref 4–11)

## 2018-05-30 PROCEDURE — 87536 HIV-1 QUANT&REVRSE TRNSCRPJ: CPT | Performed by: INTERNAL MEDICINE

## 2018-05-30 PROCEDURE — 86780 TREPONEMA PALLIDUM: CPT | Performed by: INTERNAL MEDICINE

## 2018-05-30 PROCEDURE — 36415 COLL VENOUS BLD VENIPUNCTURE: CPT | Performed by: INTERNAL MEDICINE

## 2018-05-30 PROCEDURE — 80053 COMPREHEN METABOLIC PANEL: CPT | Performed by: INTERNAL MEDICINE

## 2018-05-30 PROCEDURE — G0463 HOSPITAL OUTPT CLINIC VISIT: HCPCS | Mod: ZF

## 2018-05-30 PROCEDURE — 85025 COMPLETE CBC W/AUTO DIFF WBC: CPT | Performed by: INTERNAL MEDICINE

## 2018-05-30 PROCEDURE — 86360 T CELL ABSOLUTE COUNT/RATIO: CPT | Performed by: INTERNAL MEDICINE

## 2018-05-30 PROCEDURE — 83690 ASSAY OF LIPASE: CPT | Performed by: INTERNAL MEDICINE

## 2018-05-30 PROCEDURE — 86359 T CELLS TOTAL COUNT: CPT | Performed by: INTERNAL MEDICINE

## 2018-05-30 PROCEDURE — 80061 LIPID PANEL: CPT | Performed by: INTERNAL MEDICINE

## 2018-05-30 ASSESSMENT — PAIN SCALES - GENERAL: PAINLEVEL: NO PAIN (0)

## 2018-05-30 NOTE — MR AVS SNAPSHOT
"              After Visit Summary   5/30/2018    Viral Guerin    MRN: [id number]           Patient Information     Date Of Birth          1970        Visit Information        Provider Department      5/30/2018 3:30 PM Phuc Higgins MD [facility]        Today's Diagnoses     Human immunodeficiency virus I infection (H)    -  1    History of chlamydia infection           Follow-ups after your visit        Follow-up notes from your care team     Return in about 3 months (around 8/30/2018).      Your next 10 appointments already scheduled     Aug 29, 2018  3:30 PM CDT   (Arrive by 3:15 PM)   Return Visit with Phuc Higgins MD   [facility] ([facility])    [address] [place] [address]  [address]  [facility] [address] [phone]              Who to contact     If you have questions or need follow up information about today's clinic visit or your schedule please contact [facility] directly at [phone].  Normal or non-critical lab and imaging results will be communicated to you by MyChart, letter or phone within 4 business days after the clinic has received the results. If you do not hear from us within 7 days, please contact the clinic through MyChart or phone. If you have a critical or abnormal lab result, we will notify you by phone as soon as possible.  Submit refill requests through [employer] or call your pharmacy and they will forward the refill request to us. Please allow 3 business days for your refill to be completed.          Additional Information About Your Visit        Care EveryWhere ID     This is your Care EveryWhere ID. This could be used by other organizations to access your [facility] medical records  [id number]        Your Vitals Were     Pulse Temperature Height BMI (Body Mass Index)          91 98.3  F (36.8  C) (Oral) 1.715 m (5' 7.5\") 28.08 kg/m2   "       Blood Pressure from Last 3 Encounters:   05/30/18 126/77   09/20/17 138/74   11/16/16 116/76    Weight from Last 3 Encounters:   05/30/18 82.6 kg (182 lb)   09/20/17 85.5 kg (188 lb 6.4 oz)   11/16/16 90.9 kg (200 lb 6.4 oz)                 Today's Medication Changes          These changes are accurate as of 5/30/18 11:59 PM.  If you have any questions, ask your nurse or doctor.               Start taking these medicines.        Dose/Directions    emtricitabine-rilpivirine-tenofovir 200-25-25 MG Tabs per tablet   Commonly known as:  ODEFSEY   Used for:  Human immunodeficiency virus I infection (H)   Started by:  Phuc Higgins MD        Dose:  1 tablet   Take 1 tablet by mouth daily (with breakfast)   Quantity:  30 tablet   Refills:  11         Stop taking these medicines if you haven't already. Please contact your care team if you have questions.     ATRIPLA 600-200-300 MG per tablet   Generic drug:  efavirenz-emtrictabine-tenofovir   Stopped by:  Phuc Higgins MD                Where to get your medicines      These medications were sent to [address] [place] [address]    Hours:  TRANSPLANT PHONE NUMBER [phone] Phone:  [phone]     emtricitabine-rilpivirine-tenofovir 200-25-25 MG Tabs per tablet                Primary Care Provider Fax #    Provider Not In System [phone]                Equal Access to Services     SAMIRA GOINS AH: Hadii mauricio cuevas hadasho Soomaali, waaxda luqadaha, qaybta kaalmada adeegyada, heidy welsh. So [facility] [phone].    ATENCIÓN: Si habla español, tiene a celis disposición servicios gratuitos de asistencia lingüística. Llame al [phone].    We comply with applicable federal civil rights laws and [place] laws. We do not discriminate on the basis of race, color, national origin, age, disability, sex, sexual orientation, or gender  identity.            Thank you!     Thank you for choosing [facility]  for your care. Our goal is always to provide you with excellent care. Hearing back from our patients is one way we can continue to improve our services. Please take a few minutes to complete the written survey that you may receive in the mail after your visit with us. Thank you!             Your Updated Medication List - Protect others around you: Learn how to safely use, store and throw away your medicines at www.disposemymeds.org.          This list is accurate as of 5/30/18 11:59 PM.  Always use your most recent med list.                   Brand Name Dispense Instructions for use Diagnosis    albuterol 108 (90 Base) MCG/ACT Inhaler    PROAIR HFA    1 Inhaler    Inhale 2 puffs into the lungs every 4 hours as needed for shortness of breath / dyspnea or wheezing    Wheezing       emtricitabine-rilpivirine-tenofovir 200-25-25 MG Tabs per tablet    ODEFSEY    30 tablet    Take 1 tablet by mouth daily (with breakfast)    Human immunodeficiency virus I infection (H)       ONE-A-DAY MENS Tabs      Take  by mouth daily.    Human immunodeficiency virus (HIV) disease

## 2018-05-30 NOTE — NURSING NOTE
"Chief Complaint   Patient presents with     RECHECK     follow up with B20, tzimmer cma     /77  Pulse 91  Temp 98.3  F (36.8  C) (Oral)  Ht 1.715 m (5' 7.5\")  Wt 82.6 kg (182 lb)  BMI 28.08 kg/m2    "

## 2018-05-31 LAB
CD3 CELLS # BLD: 2317 CELLS/UL (ref 603–2990)
CD3 CELLS NFR BLD: 63 % (ref 49–84)
CD3+CD4+ CELLS # BLD: 721 CELLS/UL (ref 441–2156)
CD3+CD4+ CELLS NFR BLD: 20 % (ref 28–63)
CD3+CD4+ CELLS/CD3+CD8+ CLL BLD: 0.48 % (ref 1.4–2.6)
CD3+CD8+ CELLS # BLD: 1545 CELLS/UL (ref 125–1312)
CD3+CD8+ CELLS NFR BLD: 42 % (ref 10–40)
IFC SPECIMEN: ABNORMAL
T PALLIDUM AB SER QL: NONREACTIVE
T PALLIDUM IGG+IGM SER QL: ABNORMAL

## 2018-06-01 LAB
HIV1 RNA # PLAS NAA DL=20: NORMAL {COPIES}/ML
HIV1 RNA SERPL NAA+PROBE-LOG#: NORMAL {LOG_COPIES}/ML

## 2018-06-12 NOTE — PROGRESS NOTES
Division of Infectious Diseases and International Medicine  Department of Medicine    [facility] FOLLOW-UP VISIT NOTE [place] Mail [address]  [address] [place] [address]  Office: [phone]  Fax:  [phone]     HISTORY OF PRESENT ILLNESS:   is a 47 year old gentleman with immunoreconstituted and asymptomatic HIV infection (diagnosed in 6/10) who today returns to [facility] to follow-up antiretroviral therapy with Atripla one tablet PO qHS (initiated in 7/10).  Since starting Atripla in 2010 he has never missed a dose.  He generally says he experiences no side effects from Atripla, but today did say that occasionally he has unpleasant dreams and disrupted sleep and wonders if that is related to the medicine.  Beyond that, he otherwise says he has felt very healthy since his most recent past appointment here on 9/20/17 and has no acute complaints today.  He has not experienced any recent furuncles.  The previous pilonidal rectal cleft cyst has not been troublesome in recent months.  He is pleased he has lost some weight.  He has been cigarette free for more than two years.  He continues to perform sit-ups and push-ups for exercise daily, but otherwise does not perform much aerobic exercise.  He lacks any recent fever, chills, new night sweats (beyond baseline mild lifelong occasional sweats at night), fatigue, anorexia, weight loss, rash, EENT symptoms, chest pain, dyspnea, cough, nausea, abdominal pain, diarrhea, myalgias / arthralgias, genitourinary symptoms, headache, or other neurological symptoms.    PAST MEDICAL HISTORY:  Past Medical History:   Diagnosis Date     Childhood asthma     Resolved.     Finger fracture, left ~ 1991.     Human immunodeficiency virus (HIV) disease Diagnosed 6/11/10    CD4+ cell count never below 200, no AIDS sequelae.  Started therapy with Atripla 7/2/10.     CURRENT MEDICATIONS:  Will switch Atripla to Odefsey with his next  "medication refill in about four weeks (~ 6/25/18).  Current Outpatient Prescriptions   Medication Sig Dispense Refill     emtricitabine-rilpivirine-tenofovir (ODEFSEY) 200-25-25 MG TABS per tablet Take 1 tablet by mouth daily (with breakfast) 30 tablet 11     Multiple Vitamin (ONE-A-DAY MENS) TABS Take  by mouth daily.       albuterol (PROAIR HFA) 108 (90 Base) MCG/ACT Inhaler Inhale 2 puffs into the lungs every 4 hours as needed for shortness of breath / dyspnea or wheezing 1 Inhaler 0     SOCIAL HISTORY:  Social History     Social History     Marital status: Single     Spouse name: N/A     Number of children: N/A     Years of education: N/A     Occupational History     Not on file.     Social History Main Topics     Smoking status: Former Smoker     Packs/day: 0.30     Types: Cigarettes     Smokeless tobacco: Never Used     Alcohol use Yes      Comment: Wine daily.     Drug use: No     Sexual activity: Not on file     Other Topics Concern     Not on file     Social History Narrative    Citizen of [place], moved to [place] in early 2010.  Lives in [facility].  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   Sexually active with a female partner (insertive only) and practices safe sex with a condom.  He performs push-ups for exercise each morning and evening.  He now also goes to a gym twice a week and sometimes bikes around [place].  Quit smoking ~ early 2016.    FAMILY HISTORY:  Family History   Problem Relation Age of Onset     Hypertension Other      REVIEW OF SYMPTOMS:  As per HPI.  Complete ROS is otherwise negative.    PHYSICAL EXAMINATION:  General:  A pleasant, smiling, WDWN 47 year old man in no discomfort.  Vital signs:  /77  Pulse 91  Temp 98.3  F (36.8  C) (Oral)  Ht 1.715 m (5' 7.5\")  Wt 82.6 kg (182 lb)  BMI 28.08 kg/m2 (weight intentionally decreased six pounds versus 9/17).  Skin:  No acute rash or lesion.  Head / Neck:  NCAT. External " auditory canals are patent bilaterally.  PERRL.  EOMI.  Conjunctivae are normally pink without injection.  Sclerae are anicteric.  Oropharynx has no erythema, exudate, or lesion.  Dentition is normal.  Neck is supple without lymphadenopathy or thyromegaly.  Chest:  Bilaterally clear to auscultation, no wheeze.  CV: RRR.  Normal S1, S2 without gallop, murmur or rub.  Abdomen: Bowel sounds normal, soft, nontender, no hepatomegaly.  Back:  No lumbar spine or CVA tenderness.  :  Not examined today.  Rectal:  Not examined today.  Extremities:  Distally warm, no edema.  Neuro: Alert, oriented, memory/cognition/affect normal.  Gait is normal.    LABORATORY STUDIES (Reviewed with the patient during his appointment today):      Color Urine 09/20/2017 Yellow   Final     Appearance Urine 09/20/2017 Clear   Final     Glucose Urine 09/20/2017 Negative  NEG^Negative mg/dL Final     Bilirubin Urine 09/20/2017 Negative  NEG^Negative Final     Ketones Urine 09/20/2017 Negative  NEG^Negative mg/dL Final     Specific Gravity Urine 09/20/2017 1.033  1.003 - 1.035 Final     Blood Urine 09/20/2017 Negative  NEG^Negative Final     pH Urine 09/20/2017 5.0  5.0 - 7.0 pH Final     Protein Albumin Urine 09/20/2017 Negative  NEG^Negative mg/dL Final     Urobilinogen mg/dL 09/20/2017 0.0  0.0 - 2.0 mg/dL Final     Nitrite Urine 09/20/2017 Negative  NEG^Negative Final     Leukocyte Esterase Urine 09/20/2017 Negative  NEG^Negative Final     Source 09/20/2017 Midstream Urine   Final     WBC Urine 09/20/2017 2  0 - 2 /HPF Final     RBC Urine 09/20/2017 11* 0 - 2 /HPF Final     Squamous Epithelial /HPF Urine 09/20/2017 <1  0 - 1 /HPF Final     Mucous Urine 09/20/2017 Present* NEG^Negative /LPF Final     Sodium 09/20/2017 140  133 - 144 mmol/L Final     Potassium 09/20/2017 3.8  3.4 - 5.3 mmol/L Final     Chloride 09/20/2017 106  94 - 109 mmol/L Final     Carbon Dioxide 09/20/2017 28  20 - 32 mmol/L Final     Anion Gap 09/20/2017 6  3 - 14 mmol/L  Final     Glucose 09/20/2017 89  70 - 99 mg/dL Final     Urea Nitrogen 09/20/2017 14  7 - 30 mg/dL Final     Creatinine 09/20/2017 0.78  0.66 - 1.25 mg/dL Final     GFR Estimate 09/20/2017 >90  >60 mL/min/1.7m2 Final    Non  GFR Calc     GFR Estimate If Black 09/20/2017 >90  >60 mL/min/1.7m2 Final    African American GFR Calc     Calcium 09/20/2017 9.0  8.5 - 10.1 mg/dL Final     Bilirubin Total 09/20/2017 0.2  0.2 - 1.3 mg/dL Final     Albumin 09/20/2017 4.2  3.4 - 5.0 g/dL Final     Protein Total 09/20/2017 8.5  6.8 - 8.8 g/dL Final     Alkaline Phosphatase 09/20/2017 99  40 - 150 U/L Final     ALT 09/20/2017 34  0 - 70 U/L Final     AST 09/20/2017 23  0 - 45 U/L Final     WBC 09/20/2017 7.9  4.0 - 11.0 10e9/L Final     RBC Count 09/20/2017 4.56  4.4 - 5.9 10e12/L Final     Hemoglobin 09/20/2017 14.2  13.3 - 17.7 g/dL Final     Hematocrit 09/20/2017 43.0  40.0 - 53.0 % Final     MCV 09/20/2017 94  78 - 100 fl Final     MCH 09/20/2017 31.1  26.5 - 33.0 pg Final     MCHC 09/20/2017 33.0  31.5 - 36.5 g/dL Final     RDW 09/20/2017 12.6  10.0 - 15.0 % Final     Platelet Count 09/20/2017 228  150 - 450 10e9/L Final     Diff Method 09/20/2017 Automated Method   Final     % Neutrophils 09/20/2017 46.4  % Final     % Lymphocytes 09/20/2017 44.8  % Final     % Monocytes 09/20/2017 7.2  % Final     % Eosinophils 09/20/2017 1.1  % Final     % Basophils 09/20/2017 0.4  % Final     % Immature Granulocytes 09/20/2017 0.1  % Final     Nucleated RBCs 09/20/2017 0  0 /100 Final     Absolute Neutrophil 09/20/2017 3.7  1.6 - 8.3 10e9/L Final     Absolute Lymphocytes 09/20/2017 3.6  0.8 - 5.3 10e9/L Final     Absolute Monocytes 09/20/2017 0.6  0.0 - 1.3 10e9/L Final     Absolute Eosinophils 09/20/2017 0.1  0.0 - 0.7 10e9/L Final     Absolute Basophils 09/20/2017 0.0  0.0 - 0.2 10e9/L Final     Abs Immature Granulocytes 09/20/2017 0.0  0 - 0.4 10e9/L Final     Absolute Nucleated RBC 09/20/2017 0.0   Final     HIV-1 RNA  Quant Result 09/20/2017 <20* HIVND^HIV-1 RNA Not Detected [Copies]/mL Final    Comment: HIV-1 RNA Detected, less than 20 HIV-1 RNA copies/mL  The ERIKA AmpliPrep/ERIKA TaqMan HIV-1 test is an FDA-approved in vitro   nucleic acid amplification test for the quantitation of HIV-1 RNA in human   plasma (EDTA plasma) using the ERIKA AmpliPrep instrument for automated viral   nucleic acid extraction and the ERIKA TaqMan Analyzer or ERIKA TaqMan for   automated Real Time PCR amplification and detection of the viral nucleic acid   target.  Titer results are reported in copies/ml. This assay is intended for use in   conjunction with clinical presentation and other laboratory markers of disease   prognosis and for use as an aid in assessing viral response to antiretroviral   treatment as measured by changes in plasma HIV-1 RNA levels. This test should   not be used as a donor screening test to confirm the presence of HIV-1   infection.       HIV RNA QT Log 09/20/2017 <1.3  <1.3 [Log_copies]/mL Final     IFC Specimen 09/20/2017 Blood   Final     CD3 Mature T 09/20/2017 64  49 - 84 % Final     CD4 [place] T 09/20/2017 21* 28 - 63 % Final     CD8 Suppressor T 09/20/2017 42* 10 - 40 % Final     CD4:CD8 Ratio 09/20/2017 0.50* 1.40 - 2.60 Final     Absolute CD3 09/20/2017 2462  603 - 2990 cells/uL Final     Absolute CD4 09/20/2017 798  441 - 2156 cells/uL Final     Absolute CD8 09/20/2017 1605* 125 - 1312 cells/uL Final     Specimen Description 09/20/2017 Urine   Final     Chlamydia Trachomatis PCR 09/20/2017 Positive* NEG^Negative Final    Comment: Positive for C. trachomatis rRNA by transcription mediated amplification.  As is true for all non-culture methods, a positive specimen obtained from a   patient after therapeutic treatment cannot be interpreted as indicating the   presence of viable C. trachomatis.  Significant Value faxed/printed to  [facility] [phone] AT 1215 ON 09.21.17 ETK.       Specimen Descrip  09/20/2017 Urine   Final     N Gonorrhea PCR 09/20/2017 Negative  NEG^Negative Final    Comment: Negative for N. gonorrhoeae rRNA by transcription mediated amplification.  A negative result by transcription mediated amplification does not preclude   the presence of N. gonorrhoeae infection because results are dependent on   proper and adequate collection, absence of inhibitors, and sufficient rRNA to   be detected.       Lipase 09/20/2017 122  73 - 393 U/L Final     6/28/11:  Fasting total cholesterol 164, triglycerides 180, LDL 88, HDL 40.  HAV / ABV / HCV seronegative, antitreponemal antibody negative, toxoplasmosis IgG negative.  6/17/10  HIV susceptibility Trugene genotype (protocol screening) showed no primary reverse transcriptase or protease mutations.   6/11/10 HIV EIA screen and Western blot (all bands reactive) positive at a [facility].        IMPRESSION AND PLAN:  1. Asymptomatic HIV infection:  He continues to generally do well on Atripla one tablet qHS, but, after discussion about options today, is interested in trying a different medication to see if his occasional unpleasant dreams resolve.  He would like to still be on a one-pill-once-daily regimen and would like to stay close to his present medications which he appreciates have worked well.  He will therefore switch to Odefsey one pill daily with breakfast (he understands this medication is better absorbed with food) which he thinks he can take with equal consistency to his remarkable consistency in taking Atripla until now.  We discussed potential side effects of rilpivirine -- he will notify me if he has any difficulty with Odefsey.  He continues to have a normal absolute CD4+ cell count and undetectable HIV plasma viral load (as of 9/17).  He will finish out his present month's supply of Atipla (obtained from the pharmacy this week) and then commence the switch to Odefsey at the end of June (~ 6/28/18).  HIV and drug toxicity laboratory  studies were repeated today.  Assuming those remain good and that he tolerates Odefsey well, he will stay on that new medication and return to [facility] for follow-up in one year, or as needed before then.  2. Prior pilonidal cyst / Staph furuncle(s):  None recently.  3. Improving obesity:  He is gradually losing some weight. Again encouraged routine aerobic exercise.  4. History recurrent urinary Chlamydia infection:  Treated with azithromycin / IM ceftriaxone here on 6/22/15; subsequent follow-up test-of-cure assays were negative until 9/20/17 when he had another positive urine Chlamydia screen and was re-treated 10/3/17.  Has reports no recent symptoms and no likely re-exposures so declined STD testing today (although in 6/15 he was puzzled how he might have been exposed, so perhaps was having dissociative sexual encounters).  He consistently describes only insertive intercourse, so rectal screening is unneeded.  Gonorrhea screening was negative 9/20/17.  Will repeat a treponema antibody screen today.  5. Health Care Maintenance:   Influenza vaccine last given 11/16/16.  Menactra vaccine given 11/25/15.  Received Tdap 2/12/14.  Received a Prevnar 13 vaccine 2/12/14 and Pneumovax on 8/13/14.  HAV / HBV sero-immune on 6/18/15 post-vaccination screening.  HCV seronegative 6/28/11 and 5/16/16.  3/21/12 Quantiferon Gold assay negative.  Antitreponemal antibody screen negative most recently on 5/16/16 -- repeated today.  7/18/12 fasting lipids were good with an LDL of 58 -- repeated today.  Routine urinalysis was benign on 9/20/17.  Underwent a right lower molar dental extraction in ~ 4/15; up to date with a dentist.  No cigarettes smoked for ~ 2.5 years now.

## 2018-06-20 ENCOUNTER — TELEPHONE (OUTPATIENT)
Dept: PHARMACY | Facility: CLINIC | Age: 48
End: 2018-06-20

## 2018-06-20 NOTE — TELEPHONE ENCOUNTER
Called patient for refill reminder.    Patient will   one prescriptions on thurs afternoon    4 month of on time refill.    Follow-up Date: 07/19

## 2018-07-19 ENCOUNTER — TELEPHONE (OUTPATIENT)
Dept: PHARMACY | Facility: CLINIC | Age: 48
End: 2018-07-19

## 2018-07-19 NOTE — TELEPHONE ENCOUNTER
Called patient for refill reminder.   HE WOULD LIKE TO  ONE RX ON 7/20/18 AROUND 9:00AM  Patient will   1 prescriptions on 7/20    1 month of on time refill.    Follow-up Date: 8/15/18  Nelly Heard, [facility]  [phone]

## 2018-08-15 ENCOUNTER — TELEPHONE (OUTPATIENT)
Dept: PHARMACY | Facility: CLINIC | Age: 48
End: 2018-08-15

## 2018-08-15 NOTE — TELEPHONE ENCOUNTER
Pt called back.   Patient will   1 prescriptions on 8/16/18    5 month of on time refill.    Follow-up Date: 09/14/18

## 2018-08-15 NOTE — TELEPHONE ENCOUNTER
Attempted to contact the patient to for refill reminder call,  left message on voicemail    Follow-up Date: 08/17/18 2nd attempt    Marlene Calzada, [facility]  [phone]

## 2018-08-28 ENCOUNTER — TELEPHONE (OUTPATIENT)
Dept: INFECTIOUS DISEASES | Facility: CLINIC | Age: 48
End: 2018-08-28

## 2018-08-28 NOTE — TELEPHONE ENCOUNTER
Patient contacted and reminded of upcoming appointment.  Patient confirmed they will be attending.  Patient instructed to bring updated medications list to appointment.  Lalitha Haywood MA

## 2018-09-15 ENCOUNTER — TELEPHONE (OUTPATIENT)
Dept: PHARMACY | Facility: CLINIC | Age: 48
End: 2018-09-15

## 2018-09-15 NOTE — TELEPHONE ENCOUNTER
Called patient for refill reminder.    Patient will   1 prescriptions on 09/17/18    Follow-up Date: 10/16/18    Marlene Calzada, [facility]  [phone]

## 2018-10-10 ENCOUNTER — OFFICE VISIT (OUTPATIENT)
Dept: INFECTIOUS DISEASES | Facility: CLINIC | Age: 48
End: 2018-10-10
Attending: INTERNAL MEDICINE
Payer: COMMERCIAL

## 2018-10-10 VITALS
SYSTOLIC BLOOD PRESSURE: 109 MMHG | DIASTOLIC BLOOD PRESSURE: 71 MMHG | WEIGHT: 190.1 LBS | HEIGHT: 68 IN | TEMPERATURE: 97.9 F | OXYGEN SATURATION: 98 % | HEART RATE: 71 BPM | BODY MASS INDEX: 28.81 KG/M2

## 2018-10-10 DIAGNOSIS — Z86.19 HISTORY OF CHLAMYDIA INFECTION: ICD-10-CM

## 2018-10-10 DIAGNOSIS — H53.8 BLURRY VISION: ICD-10-CM

## 2018-10-10 DIAGNOSIS — Z21 HUMAN IMMUNODEFICIENCY VIRUS I INFECTION (H): ICD-10-CM

## 2018-10-10 DIAGNOSIS — Z23 FLU VACCINE NEED: ICD-10-CM

## 2018-10-10 DIAGNOSIS — Z21 HUMAN IMMUNODEFICIENCY VIRUS I INFECTION (H): Primary | ICD-10-CM

## 2018-10-10 DIAGNOSIS — Z71.6 ENCOUNTER FOR TOBACCO USE CESSATION COUNSELING: ICD-10-CM

## 2018-10-10 LAB
ALBUMIN SERPL-MCNC: 4.6 G/DL (ref 3.4–5)
ALP SERPL-CCNC: 76 U/L (ref 40–150)
ALT SERPL W P-5'-P-CCNC: 30 U/L (ref 0–70)
ANION GAP SERPL CALCULATED.3IONS-SCNC: 5 MMOL/L (ref 3–14)
AST SERPL W P-5'-P-CCNC: 23 U/L (ref 0–45)
BASOPHILS # BLD AUTO: 0.1 10E9/L (ref 0–0.2)
BASOPHILS NFR BLD AUTO: 0.5 %
BILIRUB SERPL-MCNC: 0.3 MG/DL (ref 0.2–1.3)
BUN SERPL-MCNC: 14 MG/DL (ref 7–30)
CALCIUM SERPL-MCNC: 9.2 MG/DL (ref 8.5–10.1)
CHLORIDE SERPL-SCNC: 107 MMOL/L (ref 94–109)
CO2 SERPL-SCNC: 29 MMOL/L (ref 20–32)
CREAT SERPL-MCNC: 1.03 MG/DL (ref 0.66–1.25)
DIFFERENTIAL METHOD BLD: NORMAL
EOSINOPHIL # BLD AUTO: 0.2 10E9/L (ref 0–0.7)
EOSINOPHIL NFR BLD AUTO: 2.5 %
ERYTHROCYTE [DISTWIDTH] IN BLOOD BY AUTOMATED COUNT: 12.3 % (ref 10–15)
GFR SERPL CREATININE-BSD FRML MDRD: 77 ML/MIN/1.7M2
GLUCOSE SERPL-MCNC: 81 MG/DL (ref 70–99)
HCT VFR BLD AUTO: 46.6 % (ref 40–53)
HGB BLD-MCNC: 15.1 G/DL (ref 13.3–17.7)
IMM GRANULOCYTES # BLD: 0 10E9/L (ref 0–0.4)
IMM GRANULOCYTES NFR BLD: 0.2 %
LIPASE SERPL-CCNC: 134 U/L (ref 73–393)
LYMPHOCYTES # BLD AUTO: 4.4 10E9/L (ref 0.8–5.3)
LYMPHOCYTES NFR BLD AUTO: 46.9 %
MCH RBC QN AUTO: 31.3 PG (ref 26.5–33)
MCHC RBC AUTO-ENTMCNC: 32.4 G/DL (ref 31.5–36.5)
MCV RBC AUTO: 97 FL (ref 78–100)
MONOCYTES # BLD AUTO: 0.5 10E9/L (ref 0–1.3)
MONOCYTES NFR BLD AUTO: 4.9 %
NEUTROPHILS # BLD AUTO: 4.2 10E9/L (ref 1.6–8.3)
NEUTROPHILS NFR BLD AUTO: 45 %
NRBC # BLD AUTO: 0 10*3/UL
NRBC BLD AUTO-RTO: 0 /100
PLATELET # BLD AUTO: 224 10E9/L (ref 150–450)
POTASSIUM SERPL-SCNC: 3.8 MMOL/L (ref 3.4–5.3)
PROT SERPL-MCNC: 9 G/DL (ref 6.8–8.8)
RBC # BLD AUTO: 4.82 10E12/L (ref 4.4–5.9)
SODIUM SERPL-SCNC: 141 MMOL/L (ref 133–144)
WBC # BLD AUTO: 9.4 10E9/L (ref 4–11)

## 2018-10-10 PROCEDURE — 85025 COMPLETE CBC W/AUTO DIFF WBC: CPT | Performed by: INTERNAL MEDICINE

## 2018-10-10 PROCEDURE — 86359 T CELLS TOTAL COUNT: CPT | Performed by: INTERNAL MEDICINE

## 2018-10-10 PROCEDURE — 86780 TREPONEMA PALLIDUM: CPT | Performed by: INTERNAL MEDICINE

## 2018-10-10 PROCEDURE — 86360 T CELL ABSOLUTE COUNT/RATIO: CPT | Performed by: INTERNAL MEDICINE

## 2018-10-10 PROCEDURE — 87491 CHLMYD TRACH DNA AMP PROBE: CPT | Performed by: INTERNAL MEDICINE

## 2018-10-10 PROCEDURE — 90686 IIV4 VACC NO PRSV 0.5 ML IM: CPT | Mod: ZF | Performed by: INTERNAL MEDICINE

## 2018-10-10 PROCEDURE — 87591 N.GONORRHOEAE DNA AMP PROB: CPT | Performed by: INTERNAL MEDICINE

## 2018-10-10 PROCEDURE — 80053 COMPREHEN METABOLIC PANEL: CPT | Performed by: INTERNAL MEDICINE

## 2018-10-10 PROCEDURE — 87536 HIV-1 QUANT&REVRSE TRNSCRPJ: CPT | Performed by: INTERNAL MEDICINE

## 2018-10-10 PROCEDURE — 36415 COLL VENOUS BLD VENIPUNCTURE: CPT | Performed by: INTERNAL MEDICINE

## 2018-10-10 PROCEDURE — G0463 HOSPITAL OUTPT CLINIC VISIT: HCPCS

## 2018-10-10 PROCEDURE — 25000128 H RX IP 250 OP 636: Mod: ZF | Performed by: INTERNAL MEDICINE

## 2018-10-10 PROCEDURE — 83690 ASSAY OF LIPASE: CPT | Performed by: INTERNAL MEDICINE

## 2018-10-10 PROCEDURE — G0008 ADMIN INFLUENZA VIRUS VAC: HCPCS | Mod: ZF

## 2018-10-10 RX ORDER — NICOTINE 21 MG/24HR
1 PATCH, TRANSDERMAL 24 HOURS TRANSDERMAL EVERY 24 HOURS
Qty: 30 PATCH | Refills: 1 | Status: SHIPPED | OUTPATIENT
Start: 2018-10-10 | End: 2019-04-10

## 2018-10-10 RX ADMIN — INFLUENZA A VIRUS A/MICHIGAN/45/2015 X-275 (H1N1) ANTIGEN (FORMALDEHYDE INACTIVATED), INFLUENZA A VIRUS A/SINGAPORE/INFIMH-16-0019/2016 IVR-186 (H3N2) ANTIGEN (FORMALDEHYDE INACTIVATED), INFLUENZA B VIRUS B/PHUKET/3073/2013 ANTIGEN (FORMALDEHYDE INACTIVATED), AND INFLUENZA B VIRUS B/MARYLAND/15/2016 BX-69A ANTIGEN (FORMALDEHYDE INACTIVATED) 0.5 ML: 15; 15; 15; 15 INJECTION, SUSPENSION INTRAMUSCULAR at 18:52

## 2018-10-10 ASSESSMENT — PAIN SCALES - GENERAL: PAINLEVEL: NO PAIN (0)

## 2018-10-10 NOTE — NURSING NOTE
"Chief Complaint   Patient presents with     RECHECK     B20 f/u     /71  Pulse 71  Temp 97.9  F (36.6  C) (Oral)  Ht 1.715 m (5' 7.5\")  Wt 86.2 kg (190 lb 1.6 oz)  SpO2 98%  BMI 29.33 kg/m2  Kari Hernandez    "

## 2018-10-10 NOTE — PROGRESS NOTES
"Interval history    Viral Guerin is a 46 yo M with diagnosis of HIV with undetectable viral loads x3 years, presents for routine follow up. He was recently switched from Atripla to Odefsey in June of last year due to odd dreams and side effects, and he remains excellently compliant with his HIV medications, not having missed a dose in almost a decade.     He experiences no side effects with regard to the change to Odefsey.     He is in a new relationship and is very anxious about telling her about his diagnosis. We talked for a while about the need to do this, the implications, suggestions of how to do it, and to not assume what her reaction would be when he does tell her. Also talked at length about stigma of the diagnosis especially in his community.     Viral also started smoking again recently, in the setting of being around friends who smoke, and now he is very interested in quitting smoking again, and wants refills of the nicotine gum and patches.     He also is complaining of worsening vision when looking at things up close, and wants to go see an eye doctor.     No diarrhea, no constipation, no nausea, vomiting, no shortness of breath, cough, palpitations, chest pains, rashes, swelling, dizziness, fevers, chills, weight changes.     Vitals  /71  Pulse 71  Temp 97.9  F (36.6  C) (Oral)  Ht 1.715 m (5' 7.5\")  Wt 86.2 kg (190 lb 1.6 oz)  SpO2 98%  BMI 29.33 kg/m2    Physical Exam  Gen: alert, oriented, in no distress, healthy appearing, well groomed.   HEENT: EOMI, PERRL, mmm, no oral lesions or ulcers, no cervical or supraclavicular LAD.   CV: RRR, no murmurs  Resp: CTAB, no distress on RA  Abd: soft, non-distended, non-tender  : deferred  Ext: no edema, wwp  Skin: no lesions or rashes.     Data  Labs pending  No imaging    Assessment and Plan    46 yo M with undetectable viral load for a couple of years presents for follow up, recently switched from Atripla to Odefsey.     #HIV  Undetectable since " 2014. On Odefsey as of June 2018 without any side effects. Did extensive counseling today re transmission of HIV to partner and risks in conceiving, as he may want to have a baby.   - Labs- T-subtypes, HIV viral load, CMP, CBC  - No need for refill at this time  - STI testing- urine gonorrhea, and chlamydia, syphilis serum    #Smoking cessation  Counseled today. Very motivated to quit.   - Nicotine patches- 14mg and 7mg with 1 refill each.   - Nicotine gum     #Blurry vision  Has never seen an eye doctor, possibly presbyopia but will evaluate further with ophthalmology apt. No concerning s/s today.   - Ophthalmology appointment    #Preventative medicine  Discussed first colonoscopy will be at age 51. Of note, last TDap was in 2014. Pneumo-poly was in 2014.   - Flu shot today  - Other vaccines are up to date.     Patient seen and staffed with Dr. Gisela Payton  PGY 2 Internal Medicine  [id number]

## 2018-10-10 NOTE — MR AVS SNAPSHOT
After Visit Summary   10/10/2018    Viral Guerin    MRN: [id number]           Patient Information     Date Of Birth          1970        Visit Information        Provider Department      10/10/2018 5:00 PM Phuc Higgins MD [facility]        Today's Diagnoses     Encounter for tobacco use cessation counseling    -  1    Flu vaccine need        Blurry vision           Follow-ups after your visit        Additional Services     Ophthalmology Adult Referral                 Follow-up notes from your care team     Return in about 1 year (around 10/10/2019).      Your next 10 appointments already scheduled     Oct 17, 2018 12:40 PM CDT   (Arrive by 12:25 PM)   NEW GENERAL with Chuckie Dominguez OD   [facility] [place]    [address]  4th Floor  [facility] [address] [phone]            Oct 09, 2019  5:00 PM CDT   (Arrive by 4:45 PM)   Return Visit with Phuc Higgins MD   [facility]    [address]  [address]  [facility] [address] [phone]              Who to contact     If you have questions or need follow up information about today's clinic visit or your schedule please contact [facility] INFECTI[facility] DISEASES directly at [phone].  Normal or non-critical lab and imaging results will be communicated to you by MyChart, letter or phone within 4 business days after the clinic has received the results. If you do not hear from us within 7 days, please contact the clinic through MyChart or phone. If you have a critical or abnormal lab result, we will notify you by phone as soon as possible.  Submit refill requests through [employer] or call your pharmacy and they will forward the refill request to us. Please allow 3 business days for your refill to be completed.          Additional Information  "About Your Visit        Cloud [employer] Information     [employer] lets you send messages to your doctor, view your test results, renew your prescriptions, schedule appointments and more. To sign up, go to [email or web address] . Click on \"Log in\" on the left side of the screen, which will take you to the Welcome page. Then click on \"Sign up Now\" on the right side of the page.     You will be asked to enter the access code listed below, as well as some personal information. Please follow the directions to create your username and password.     Your access code is: [email or web address]  Expires: 2018  6:31 AM     Your access code will  in 90 days. If you need help or a new code, please call your [place] clinic or [phone].        Care EveryWhere ID     This is your Care EveryWhere ID. This could be used by other organizations to access your [place] medical records  [id number]        Your Vitals Were     Pulse Temperature Height Pulse Oximetry BMI (Body Mass Index)       71 97.9  F (36.6  C) (Oral) 1.715 m (5' 7.5\") 98% 29.33 kg/m2        Blood Pressure from Last 3 Encounters:   10/10/18 109/71   18 126/77   17 138/74    Weight from Last 3 Encounters:   10/10/18 86.2 kg (190 lb 1.6 oz)   18 82.6 kg (182 lb)   17 85.5 kg (188 lb 6.4 oz)              We Performed the Following     Ophthalmology Adult Referral          Today's Medication Changes          These changes are accurate as of 10/10/18  6:56 PM.  If you have any questions, ask your nurse or doctor.               Start taking these medicines.        Dose/Directions    * nicotine 7 MG/24HR 24 hr patch   Commonly known as:  NICODERM CQ   Used for:  Encounter for tobacco use cessation counseling   Started by:  Phuc Higgins MD        Dose:  1 patch   Place 1 patch onto the skin every 24 hours   Quantity:  30 patch   Refills:  1       * nicotine 14 MG/24HR 24 hr patch   Commonly known as:  NICODERM CQ   Used for:  Encounter for " tobacco use cessation counseling   Started by:  Phuc Higgins MD        Dose:  1 patch   Place 1 patch onto the skin every 24 hours   Quantity:  30 patch   Refills:  1       nicotine polacrilex 2 MG gum   Commonly known as:  [facility] NICOTINE   Used for:  Encounter for tobacco use cessation counseling   Started by:  Phuc Higgins MD        Dose:  2 mg   Place 1 each (2 mg) inside cheek as needed for smoking cessation   Quantity:  60 tablet   Refills:  3       * Notice:  This list has 2 medication(s) that are the same as other medications prescribed for you. Read the directions carefully, and ask your doctor or other care provider to review them with you.         Where to get your medicines      These medications were sent to [address]  [address] [place] [address] [address] [address]    Hours:  TRANSPLANT PHONE NUMBER [phone] Phone:  [phone]     nicotine 14 MG/24HR 24 hr patch    nicotine 7 MG/24HR 24 hr patch    nicotine polacrilex 2 MG gum                Primary Care Provider Fax #    Physician No Ref-Primary [phone]       No address on file        Equal Access to Services     SAMIR[facility] AH: Hadii mauricio cuevas hadashshady Soavery, waaxda luqadaha, qaybta kaalmada piyush, heidy welsh. So [facility] [phone].    ATENCIÓN: Si habla español, tiene a celis disposición servicios gratuitos de asistencia lingüística. Llame al [phone].    We comply with applicable federal civil rights laws and [place] laws. We do not discriminate on the basis of race, color, national origin, age, disability, sex, sexual orientation, or gender identity.            Thank you!     Thank you for choosing [facility] INFECTIOUS DISEASES  for your care. Our goal is always to provide you with excellent care. Hearing back from our patients is one way we can continue to improve our services. Please take a few  minutes to complete the written survey that you may receive in the mail after your visit with us. Thank you!             Your Updated Medication List - Protect others around you: Learn how to safely use, store and throw away your medicines at www.disposemymeds.org.          This list is accurate as of 10/10/18  6:56 PM.  Always use your most recent med list.                   Brand Name Dispense Instructions for use Diagnosis    albuterol 108 (90 Base) MCG/ACT inhaler    PROAIR HFA    1 Inhaler    Inhale 2 puffs into the lungs every 4 hours as needed for shortness of breath / dyspnea or wheezing    Wheezing       emtricitabine-rilpivirine-tenofovir 200-25-25 MG Tabs per tablet    ODEFSEY    30 tablet    Take 1 tablet by mouth daily (with breakfast)    Human immunodeficiency virus I infection (H)       IBUPROFEN PO           * nicotine 7 MG/24HR 24 hr patch    NICODERM CQ    30 patch    Place 1 patch onto the skin every 24 hours    Encounter for tobacco use cessation counseling       * nicotine 14 MG/24HR 24 hr patch    NICODERM CQ    30 patch    Place 1 patch onto the skin every 24 hours    Encounter for tobacco use cessation counseling       nicotine polacrilex 2 MG gum    [facility]S NICOTINE    60 tablet    Place 1 each (2 mg) inside cheek as needed for smoking cessation    Encounter for tobacco use cessation counseling       ONE-A-DAY MENS Tabs      Take  by mouth daily.    Human immunodeficiency virus (HIV) disease (H)       * Notice:  This list has 2 medication(s) that are the same as other medications prescribed for you. Read the directions carefully, and ask your doctor or other care provider to review them with you.

## 2018-10-10 NOTE — NURSING NOTE
"Injectable Influenza Immunization Documentation    1.  Has the patient received the information for the injectable influenza vaccine? YES     2. Is the patient 6 months of age or older? YES     3. Does the patient have any of the following contraindications?         Severe allergy to eggs? No     Severe allergic reaction to previous influenza vaccines? No   Severe allergy to latex? No       History of Guillain-[place] syndrome? No     Currently have a temperature greater than 100.4F? No        4.  Severely egg allergic patients should have flu vaccine eligibility assessed by an MD, RN, or pharmacist, and those who received flu vaccine should be observed for 15 min by an MD, RN, Pharmacist, Medical Technician, or member of clinic staff.\": YES    5. Latex-allergic patients should be given latex-free influenza vaccine Yes. Please reference the Vaccine latex table to determine if your clinic s product is latex-containing.       Vaccination given by Kari Hernandez          "

## 2018-10-11 LAB
CD3 CELLS # BLD: 2989 CELLS/UL (ref 603–2990)
CD3 CELLS NFR BLD: 61 % (ref 49–84)
CD3+CD4+ CELLS # BLD: 997 CELLS/UL (ref 441–2156)
CD3+CD4+ CELLS NFR BLD: 20 % (ref 28–63)
CD3+CD4+ CELLS/CD3+CD8+ CLL BLD: 0.51 % (ref 1.4–2.6)
CD3+CD8+ CELLS # BLD: 1927 CELLS/UL (ref 125–1312)
CD3+CD8+ CELLS NFR BLD: 39 % (ref 10–40)
IFC SPECIMEN: ABNORMAL
T PALLIDUM AB SER QL: NONREACTIVE

## 2018-10-12 LAB
C TRACH DNA SPEC QL NAA+PROBE: NEGATIVE
HIV1 RNA # PLAS NAA DL=20: 318 {COPIES}/ML
HIV1 RNA SERPL NAA+PROBE-LOG#: 2.5 {LOG_COPIES}/ML
N GONORRHOEA DNA SPEC QL NAA+PROBE: NEGATIVE
SPECIMEN SOURCE: NORMAL
SPECIMEN SOURCE: NORMAL

## 2018-10-16 ENCOUNTER — TELEPHONE (OUTPATIENT)
Dept: PHARMACY | Facility: CLINIC | Age: 48
End: 2018-10-16

## 2018-10-16 PROBLEM — Z21 HUMAN IMMUNODEFICIENCY VIRUS I INFECTION (H): Status: ACTIVE | Noted: 2018-10-16

## 2018-10-16 NOTE — TELEPHONE ENCOUNTER
Called patient for refill reminder.    Patient will   1 prescriptions on 10/17/18    Follow-up Date: 11/15/18    Marlene Calzada, [facility]  [phone]

## 2018-10-16 NOTE — PROGRESS NOTES
[facility] Staff Note: Mr. Guerin was seen, examined, and the case was discussed with Dr. Payton, Medicine HIV Resident -- I agree with her interval history and examination, assessment and plan in this outpatient ID / HIV Progress note. The note reflects my observations and opinions, and the plan outlined fully reflects my approach. I have reviewed the available history, laboratory results, radiography and other reports with the Resident.  Beyond that mentioned in the Resident's note, his complete ROS was otherwise negative today and, in general, he is feeling stably well.  We discussed at length today his concerns regarding disclosure of HIV status to his partner.

## 2018-10-17 ENCOUNTER — OFFICE VISIT (OUTPATIENT)
Dept: OPHTHALMOLOGY | Facility: CLINIC | Age: 48
End: 2018-10-17
Payer: COMMERCIAL

## 2018-10-17 DIAGNOSIS — H11.131 CONJUNCTIVAL PIGMENTATIONS OF RIGHT EYE: ICD-10-CM

## 2018-10-17 DIAGNOSIS — H52.4 PRESBYOPIA: ICD-10-CM

## 2018-10-17 DIAGNOSIS — Z21 HUMAN IMMUNODEFICIENCY VIRUS I INFECTION (H): Primary | ICD-10-CM

## 2018-10-17 ASSESSMENT — REFRACTION_MANIFEST
OS_SPHERE: -0.25
OS_CYLINDER: +0.25
OD_SPHERE: PLANO
OS_AXIS: 091
OD_ADD: +1.75
OD_AXIS: 075
OD_CYLINDER: +0.25
OS_ADD: +1.75

## 2018-10-17 ASSESSMENT — CUP TO DISC RATIO
OS_RATIO: 0.4
OD_RATIO: 0.4

## 2018-10-17 ASSESSMENT — VISUAL ACUITY
OS_SC: 20/15
OD_SC: J5
OD_SC: 20/15
OS_SC: J5
METHOD: SNELLEN - LINEAR
OS_SC+: -2

## 2018-10-17 ASSESSMENT — SLIT LAMP EXAM - LIDS
COMMENTS: NORMAL
COMMENTS: NORMAL

## 2018-10-17 ASSESSMENT — CONF VISUAL FIELD
OS_NORMAL: 1
OD_NORMAL: 1

## 2018-10-17 ASSESSMENT — TONOMETRY
OD_IOP_MMHG: 11
IOP_METHOD: ICARE

## 2018-10-17 ASSESSMENT — EXTERNAL EXAM - LEFT EYE: OS_EXAM: NORMAL

## 2018-10-17 ASSESSMENT — EXTERNAL EXAM - RIGHT EYE: OD_EXAM: NORMAL

## 2018-10-17 NOTE — PROGRESS NOTES
Assessment/Plan  (B20) Human immunodeficiency virus I infection (H)  (primary encounter diagnosis)  Comment: No retinopathy present  Plan: Discussed findings with patient. Patient should RTC with any vision changes- particularly new flashes/floaters/curtain over vision. Plan on monitoring each year.     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 Given longstanding nature of condition, no referral appears warranted at this time. Will monitor each year. Patient should RTC with if he notices any changes to condition.     (H52.4) Presbyopia  Plan: REFRACTION [45559]        SRx updated and released. OTC readers would likely be adequate but patient would be ok to use specs full time if he'd like.       Complete documentation of historical and exam elements from today's encounter can  be found in the full encounter summary report (not reduplicated in this progress  note). I personally obtained the chief complaint(s) and history of present illness. I  confirmed and edited as necessary the review of systems, past medical/surgical  history, family history, social history, and examination findings as documented by  others; and I examined the patient myself. I personally reviewed the relevant tests,  images, and reports as documented above. I formulated and edited as necessary the  assessment and plan and discussed the findings and management plan with the  patient and family.    Chuckie Dominguez OD

## 2018-10-17 NOTE — MR AVS SNAPSHOT
After Visit Summary   10/17/2018    Viral Guerin    MRN: [id number]           Patient Information     Date Of Birth          1970        Visit Information        Provider Department      10/17/2018 12:40 PM Chuckie Dominguez OD [facility]        Today's Diagnoses     Human immunodeficiency virus I infection (H)    -  1    Conjunctival pigmentations of right eye        Presbyopia           Follow-ups after your visit        Your next 10 appointments already scheduled     Oct 09, 2019  5:00 PM CDT   (Arrive by 4:45 PM)   Return Visit with Phuc Higgins MD   [facility] and Infectious Diseases ([facility])    [address] [place] [address]  [address]  [facility] [address] [phone]              Who to contact     Please call your clinic at [phone] to:    Ask questions about your health    Make or cancel appointments    Discuss your medicines    Learn about your test results    Speak to your doctor            Additional Information About Your Visit        MyChart Information     Appiriot is an electronic gateway that provides easy, online access to your medical records. With [employer], you can request a clinic appointment, read your test results, renew a prescription or communicate with your care team.     To sign up for Appiriot visit the website at [email or web address]   You will be asked to enter the access code listed below, as well as some personal information. Please follow the directions to create your username and password.     Your access code is: [email or web address]  Expires: 2018  6:31 AM     Your access code will  in 90 days. If you need help or a new code, please contact your [facility] or call [phone] for assistance.        Care EveryWhere ID     This is your Care EveryWhere ID. This could be used by other organizations to access your [place] medical records  [id number]          Blood Pressure from Last 3 Encounters:   10/10/18 109/71   05/30/18 126/77   09/20/17 138/74    Weight from Last 3 Encounters:   10/10/18 86.2 kg (190 lb 1.6 oz)   05/30/18 82.6 kg (182 lb)   09/20/17 85.5 kg (188 lb 6.4 oz)              We Performed the Following     REFRACTION [76824]        Primary Care Provider Fax #    Physician No Ref-Primary [phone]       No address on file        Equal Access to Services     CANDI GOINS : Hadii aad ku hadasho Soomaali, waaxda luqadaha, qaybta kaalmada adeegyada, heidy johnston . So [facility] [phone].    ATENCIÓN: Si habla español, tiene a celis disposición servicios gratuitos de asistencia lingüística. Llame al [phone].    We comply with applicable federal civil rights laws and [place] laws. We do not discriminate on the basis of race, color, national origin, age, disability, sex, sexual orientation, or gender identity.            Thank you!     Thank you for choosing [facility]  for your care. Our goal is always to provide you with excellent care. Hearing back from our patients is one way we can continue to improve our services. Please take a few minutes to complete the written survey that you may receive in the mail after your visit with us. Thank you!             Your Updated Medication List - Protect others around you: Learn how to safely use, store and throw away your medicines at www.disposemymeds.org.          This list is accurate as of 10/17/18 12:46 PM.  Always use your most recent med list.                   Brand Name Dispense Instructions for use Diagnosis    albuterol 108 (90 Base) MCG/ACT inhaler    PROAIR HFA    1 Inhaler    Inhale 2 puffs into the lungs every 4 hours as needed for shortness of breath / dyspnea or wheezing    Wheezing       emtricitabine-rilpivirine-tenofovir 200-25-25 MG Tabs per tablet    ODEFSEY    30 tablet    Take 1 tablet by mouth daily (with breakfast)    Human immunodeficiency virus I  infection (H)       IBUPROFEN PO           * nicotine 7 MG/24HR 24 hr patch    NICODERM CQ    30 patch    Place 1 patch onto the skin every 24 hours    Encounter for tobacco use cessation counseling       * nicotine 14 MG/24HR 24 hr patch    NICODERM CQ    30 patch    Place 1 patch onto the skin every 24 hours    Encounter for tobacco use cessation counseling       nicotine polacrilex 2 MG gum    CVS NICOTINE    60 tablet    Place 1 each (2 mg) inside cheek as needed for smoking cessation    Encounter for tobacco use cessation counseling       ONE-A-DAY MENS Tabs      Take  by mouth daily.    Human immunodeficiency virus (HIV) disease (H)       * Notice:  This list has 2 medication(s) that are the same as other medications prescribed for you. Read the directions carefully, and ask your doctor or other care provider to review them with you.

## 2018-10-17 NOTE — LETTER
10/17/2018       RE: Viral Guerin  [address]  [facility] [address]       Assessment/Plan  (B20) Human immunodeficiency virus I infection (H)  (primary encounter diagnosis)  Comment: No retinopathy present  Plan: Discussed findings with patient. Patient should RTC with any vision changes- particularly new flashes/floaters/curtain over vision. Plan on monitoring each year.     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 Given longstanding nature of condition, no referral appears warranted at this time. Will monitor each year. Patient should RTC with if he notices any changes to condition.     (H52.4) Presbyopia  Plan: REFRACTION [71831]        SRx updated and released. OTC readers would likely be adequate but patient would be ok to use specs full time if he'd like.       Complete documentation of historical and exam elements from today's encounter can  be found in the full encounter summary report (not reduplicated in this progress  note). I personally obtained the chief complaint(s) and history of present illness. I  confirmed and edited as necessary the review of systems, past medical/surgical  history, family history, social history, and examination findings as documented by  others; and I examined the patient myself. I personally reviewed the relevant tests,  images, and reports as documented above. I formulated and edited as necessary the  assessment and plan and discussed the findings and management plan with the  patient and family.    TRESSA Galeano OD

## 2018-10-17 NOTE — NURSING NOTE
Chief Complaints and History of Present Illnesses   Patient presents with     Consult For     Subjective visual disturbance     HPI    Affected eye(s):  Both   Symptoms:        Duration:  1 year   Frequency:  Constant       Do you have eye pain now?:  No      Comments:  Pt. States that he has noticed worsening in near vision.  No c/o DVA BE.  Occasional floaters BE.  No flashes BE.  No c/o comfort BE.  Kayleen Steele COT 12:06 PM October 17, 2018

## 2018-11-15 ENCOUNTER — TELEPHONE (OUTPATIENT)
Dept: PHARMACY | Facility: CLINIC | Age: 48
End: 2018-11-15

## 2018-11-16 NOTE — TELEPHONE ENCOUNTER
Called patient for refill reminder.    Patient will   1 prescriptions on 11/16/18    Follow-up Date: 12/14/18    Marlene Calzada, [facility]  [phone]

## 2018-12-13 ENCOUNTER — TELEPHONE (OUTPATIENT)
Dept: PHARMACY | Facility: CLINIC | Age: 48
End: 2018-12-13

## 2018-12-13 NOTE — TELEPHONE ENCOUNTER
Called patient for refill reminder.    Will mail 1 prescriptions address has been verified.     Follow-up Date: 01/14/19    Marlene Calzada, [facility]  [phone]

## 2019-01-15 ENCOUNTER — TELEPHONE (OUTPATIENT)
Dept: PHARMACY | Facility: CLINIC | Age: 49
End: 2019-01-15

## 2019-01-15 NOTE — TELEPHONE ENCOUNTER
Called patient for refill reminder.    Will mail out 1 prescription address has been verified.     1 month of on time refill.    Follow-up Date: 02/15/2019    Matheus Shukla, 2nd Year Pharmacy Intern  [facility]

## 2019-02-15 ENCOUNTER — TELEPHONE (OUTPATIENT)
Dept: PHARMACY | Facility: CLINIC | Age: 49
End: 2019-02-15

## 2019-02-15 NOTE — TELEPHONE ENCOUNTER
Attempted to contact the patient to for refill reminder call,  left message on voicemail    Follow-up Date: 02/21/19    Marlene Calzada [facility]  [phone]

## 2019-03-20 ENCOUNTER — TELEPHONE (OUTPATIENT)
Dept: PHARMACY | Facility: CLINIC | Age: 49
End: 2019-03-20

## 2019-03-20 NOTE — TELEPHONE ENCOUNTER
Called patient for refill reminder.    Will mail 1 prescriptions address has been verified.     Follow-up Date: 04/19/19    Marlene Calzada [facility]  [phone]

## 2019-04-10 ENCOUNTER — OFFICE VISIT (OUTPATIENT)
Dept: INFECTIOUS DISEASES | Facility: CLINIC | Age: 49
End: 2019-04-10
Attending: INTERNAL MEDICINE
Payer: COMMERCIAL

## 2019-04-10 VITALS
DIASTOLIC BLOOD PRESSURE: 83 MMHG | HEART RATE: 80 BPM | HEIGHT: 68 IN | BODY MASS INDEX: 31.17 KG/M2 | TEMPERATURE: 98.5 F | SYSTOLIC BLOOD PRESSURE: 123 MMHG | WEIGHT: 205.7 LBS

## 2019-04-10 DIAGNOSIS — E66.811 OBESITY (BMI 30.0-34.9): ICD-10-CM

## 2019-04-10 DIAGNOSIS — Z21 HUMAN IMMUNODEFICIENCY VIRUS I INFECTION (H): Primary | ICD-10-CM

## 2019-04-10 DIAGNOSIS — Z20.2 EXPOSURE TO SEXUALLY TRANSMITTED DISEASE (STD): ICD-10-CM

## 2019-04-10 DIAGNOSIS — K62.5 BRBPR (BRIGHT RED BLOOD PER RECTUM): ICD-10-CM

## 2019-04-10 DIAGNOSIS — K64.8 INTERNAL HEMORRHOID, BLEEDING: ICD-10-CM

## 2019-04-10 DIAGNOSIS — R36.9 PENILE DISCHARGE: ICD-10-CM

## 2019-04-10 DIAGNOSIS — Z21 HUMAN IMMUNODEFICIENCY VIRUS I INFECTION (H): ICD-10-CM

## 2019-04-10 LAB
ALBUMIN SERPL-MCNC: 4.1 G/DL (ref 3.4–5)
ALBUMIN UR-MCNC: NEGATIVE MG/DL
ALP SERPL-CCNC: 61 U/L (ref 40–150)
ALT SERPL W P-5'-P-CCNC: 28 U/L (ref 0–70)
ANION GAP SERPL CALCULATED.3IONS-SCNC: 6 MMOL/L (ref 3–14)
APPEARANCE UR: CLEAR
AST SERPL W P-5'-P-CCNC: 22 U/L (ref 0–45)
BASOPHILS # BLD AUTO: 0.1 10E9/L (ref 0–0.2)
BASOPHILS NFR BLD AUTO: 0.5 %
BILIRUB SERPL-MCNC: 0.3 MG/DL (ref 0.2–1.3)
BILIRUB UR QL STRIP: NEGATIVE
BUN SERPL-MCNC: 14 MG/DL (ref 7–30)
CALCIUM SERPL-MCNC: 9.4 MG/DL (ref 8.5–10.1)
CHLORIDE SERPL-SCNC: 106 MMOL/L (ref 94–109)
CO2 SERPL-SCNC: 26 MMOL/L (ref 20–32)
COLOR UR AUTO: YELLOW
CREAT SERPL-MCNC: 0.94 MG/DL (ref 0.66–1.25)
DIFFERENTIAL METHOD BLD: NORMAL
EOSINOPHIL # BLD AUTO: 0.2 10E9/L (ref 0–0.7)
EOSINOPHIL NFR BLD AUTO: 2 %
ERYTHROCYTE [DISTWIDTH] IN BLOOD BY AUTOMATED COUNT: 12.3 % (ref 10–15)
GFR SERPL CREATININE-BSD FRML MDRD: >90 ML/MIN/{1.73_M2}
GLUCOSE SERPL-MCNC: 81 MG/DL (ref 70–99)
GLUCOSE UR STRIP-MCNC: NEGATIVE MG/DL
HCT VFR BLD AUTO: 44.3 % (ref 40–53)
HGB BLD-MCNC: 14.2 G/DL (ref 13.3–17.7)
HGB UR QL STRIP: NEGATIVE
IMM GRANULOCYTES # BLD: 0 10E9/L (ref 0–0.4)
IMM GRANULOCYTES NFR BLD: 0.3 %
KETONES UR STRIP-MCNC: NEGATIVE MG/DL
LEUKOCYTE ESTERASE UR QL STRIP: NEGATIVE
LIPASE SERPL-CCNC: 129 U/L (ref 73–393)
LYMPHOCYTES # BLD AUTO: 4.6 10E9/L (ref 0.8–5.3)
LYMPHOCYTES NFR BLD AUTO: 47.1 %
MCH RBC QN AUTO: 31 PG (ref 26.5–33)
MCHC RBC AUTO-ENTMCNC: 32.1 G/DL (ref 31.5–36.5)
MCV RBC AUTO: 97 FL (ref 78–100)
MONOCYTES # BLD AUTO: 0.7 10E9/L (ref 0–1.3)
MONOCYTES NFR BLD AUTO: 6.8 %
MUCOUS THREADS #/AREA URNS LPF: PRESENT /LPF
NEUTROPHILS # BLD AUTO: 4.2 10E9/L (ref 1.6–8.3)
NEUTROPHILS NFR BLD AUTO: 43.3 %
NITRATE UR QL: NEGATIVE
NRBC # BLD AUTO: 0 10*3/UL
NRBC BLD AUTO-RTO: 0 /100
PH UR STRIP: 5 PH (ref 5–7)
PLATELET # BLD AUTO: 222 10E9/L (ref 150–450)
POTASSIUM SERPL-SCNC: 3.8 MMOL/L (ref 3.4–5.3)
PROT SERPL-MCNC: 8.2 G/DL (ref 6.8–8.8)
RBC # BLD AUTO: 4.58 10E12/L (ref 4.4–5.9)
RBC #/AREA URNS AUTO: 2 /HPF (ref 0–2)
SODIUM SERPL-SCNC: 139 MMOL/L (ref 133–144)
SOURCE: ABNORMAL
SP GR UR STRIP: 1.02 (ref 1–1.03)
SQUAMOUS #/AREA URNS AUTO: 1 /HPF (ref 0–1)
UROBILINOGEN UR STRIP-MCNC: 0 MG/DL (ref 0–2)
WBC # BLD AUTO: 9.8 10E9/L (ref 4–11)
WBC #/AREA URNS AUTO: 5 /HPF (ref 0–5)

## 2019-04-10 PROCEDURE — 85025 COMPLETE CBC W/AUTO DIFF WBC: CPT

## 2019-04-10 PROCEDURE — 86355 B CELLS TOTAL COUNT: CPT

## 2019-04-10 PROCEDURE — 96372 THER/PROPH/DIAG INJ SC/IM: CPT | Mod: ZF

## 2019-04-10 PROCEDURE — 81001 URINALYSIS AUTO W/SCOPE: CPT

## 2019-04-10 PROCEDURE — 87591 N.GONORRHOEAE DNA AMP PROB: CPT | Performed by: INTERNAL MEDICINE

## 2019-04-10 PROCEDURE — G0463 HOSPITAL OUTPT CLINIC VISIT: HCPCS | Mod: 25,ZF

## 2019-04-10 PROCEDURE — 25000128 H RX IP 250 OP 636: Mod: ZF | Performed by: INTERNAL MEDICINE

## 2019-04-10 PROCEDURE — 25000125 ZZHC RX 250: Mod: ZF | Performed by: INTERNAL MEDICINE

## 2019-04-10 PROCEDURE — 83690 ASSAY OF LIPASE: CPT

## 2019-04-10 PROCEDURE — 86360 T CELL ABSOLUTE COUNT/RATIO: CPT

## 2019-04-10 PROCEDURE — 87536 HIV-1 QUANT&REVRSE TRNSCRPJ: CPT

## 2019-04-10 PROCEDURE — 87491 CHLMYD TRACH DNA AMP PROBE: CPT | Performed by: INTERNAL MEDICINE

## 2019-04-10 PROCEDURE — 86357 NK CELLS TOTAL COUNT: CPT

## 2019-04-10 PROCEDURE — 86780 TREPONEMA PALLIDUM: CPT

## 2019-04-10 PROCEDURE — 86359 T CELLS TOTAL COUNT: CPT

## 2019-04-10 PROCEDURE — 80053 COMPREHEN METABOLIC PANEL: CPT

## 2019-04-10 RX ORDER — LIDOCAINE HYDROCHLORIDE AND EPINEPHRINE 10; 10 MG/ML; UG/ML
1 INJECTION, SOLUTION INFILTRATION; PERINEURAL ONCE
Status: COMPLETED | OUTPATIENT
Start: 2019-04-10 | End: 2019-04-10

## 2019-04-10 RX ORDER — CEFTRIAXONE SODIUM 250 MG
250 VIAL (EA) INJECTION ONCE
Status: COMPLETED | OUTPATIENT
Start: 2019-04-10 | End: 2019-04-10

## 2019-04-10 RX ORDER — AZITHROMYCIN 500 MG/1
1000 TABLET, FILM COATED ORAL ONCE
Qty: 2 TABLET | Refills: 0 | Status: SHIPPED | OUTPATIENT
Start: 2019-04-10 | End: 2019-04-10

## 2019-04-10 RX ADMIN — CEFTRIAXONE SODIUM 250 MG: 250 INJECTION, POWDER, FOR SOLUTION INTRAMUSCULAR; INTRAVENOUS at 18:35

## 2019-04-10 RX ADMIN — LIDOCAINE HYDROCHLORIDE AND EPINEPHRINE 1 ML: 10; 10 INJECTION, SOLUTION INFILTRATION; PERINEURAL at 18:36

## 2019-04-10 ASSESSMENT — PAIN SCALES - GENERAL: PAINLEVEL: NO PAIN (0)

## 2019-04-10 ASSESSMENT — MIFFLIN-ST. JEOR: SCORE: 1774.61

## 2019-04-10 NOTE — NURSING NOTE
The following medication was given:     MEDICATION:  Lidocaine with epinephrine  ROUTE: IM  SITE: RUQ - Gluteus  DOSE: 0.9ml  LOT #: [id number]  : Hospira  EXPIRATION DATE: 01/01/2020  NDC#: [id number]   Was there drug waste? Yes  Amount of drug waste (mL): 20.1ml.  Reason for waste:  As per MD  Multi-dose vial: Yes    Princess Luis CMA  April 10, 2019

## 2019-04-10 NOTE — NURSING NOTE
"/83   Pulse 80   Temp 98.5  F (36.9  C) (Oral)   Ht 1.715 m (5' 7.5\")   Wt 93.3 kg (205 lb 11.2 oz)   BMI 31.74 kg/m    Chief Complaint   Patient presents with     RECHECK     follow up with richi ZABALAimglenna cma       "

## 2019-04-11 LAB
C TRACH DNA SPEC QL NAA+PROBE: NEGATIVE
CD3 CELLS # BLD: 2725 CELLS/UL (ref 603–2990)
CD3 CELLS NFR BLD: 64 % (ref 49–84)
CD3+CD4+ CELLS # BLD: 940 CELLS/UL (ref 441–2156)
CD3+CD4+ CELLS NFR BLD: 22 % (ref 28–63)
CD3+CD4+ CELLS/CD3+CD8+ CLL BLD: 0.55 % (ref 1.4–2.6)
CD3+CD8+ CELLS # BLD: 1695 CELLS/UL (ref 125–1312)
CD3+CD8+ CELLS NFR BLD: 40 % (ref 10–40)
IFC SPECIMEN: ABNORMAL
IMMUNODEFICIENCY MARKERS SPEC-IMP: ABNORMAL
N GONORRHOEA DNA SPEC QL NAA+PROBE: NEGATIVE
SPECIMEN SOURCE: NORMAL
SPECIMEN SOURCE: NORMAL
T PALLIDUM AB SER QL: NONREACTIVE

## 2019-04-18 NOTE — PROGRESS NOTES
Division of Infectious Diseases and International Medicine  Department of Medicine    [facility] FOLLOW-UP VISIT NOTE [place] [address]  [address] [place] [address]  Office: [phone]  Fax:  [phone]     HISTORY OF PRESENT ILLNESS:    This 48 year old gentleman with asymptomatic HIV infection (diagnosed 6/11/10) returns today to [facility] for follow-up of fairly new antiretroviral therapy comprised of Complera one tablet PO daily with breakfast (switched ~ 6/25/18 from Atripla which was initially begun in 7/10).  He has not missed a dose of his antiretroviral medications since initiating Atripla.  He reports no side effects from Complera.  He has noticed about three intermittent three-week episodes of bright red blood per rectum with (thrice weekly) defecation (in the toilet bowel water) over the past half year without rectal pain or other the past half year or so, including during the past three weeks.  He has no rectal tenderness, noticed rectal lesions, marked constipation, or other new bowel symptoms, nor any abdominal discomfort.  He had a prior episode of hemorrhoids with BRBPR about three to four years ago.  He also has noticed a non-painful yellow discharge from his urethral meatus over the past couple of weeks or so, without any dysuria, penile lesion or edema, or hematuria.  He thinks he was potentially exposed to a woman with an STD about three weeks ago.  He successfully quit smoking again about 2.5 months ago and has not taken a cigarette since.  He is confident he will never smoke again.  His breathing has been excellent since then.  He continues to perform sit-ups and push-ups for exercise nearly daily.  He has generally felt well of late and lacks additional complaints lately including no recent fever, chills, new night sweats (beyond baseline mild lifelong occasional sweats at night), fatigue, anorexia, rash, bleeding elsewhere, EENT symptoms,  chest pain, dyspnea, cough, nausea, abdominal pain, diarrhea, myalgias / arthralgias, other genitourinary symptoms, headache, or other neurological symptoms.    PAST MEDICAL HISTORY:  Past Medical History:   Diagnosis Date     Childhood asthma     Resolved.     Finger fracture, left ~ 1991.     Human immunodeficiency virus (HIV) disease (H) Diagnosed 6/11/10    CD4+ cell count never below 200, no AIDS sequelae.  Started therapy with Atripla 7/2/10.     CURRENT MEDICATIONS:  Current Outpatient Medications   Medication Sig Dispense Refill     albuterol (PROAIR HFA) 108 (90 Base) MCG/ACT Inhaler Inhale 2 puffs into the lungs every 4 hours as needed for shortness of breath / dyspnea or wheezing 1 Inhaler 0     emtricitabine-rilpivirine-tenofovir (ODEFSEY) 200-25-25 MG TABS per tablet Take 1 tablet by mouth daily (with breakfast) 30 tablet 11     hydrocortisone (ANUSOL-HC) 2.5 % cream Place rectally 2 times daily as needed for hemorrhoids 30 g 1     IBUPROFEN PO        Multiple Vitamin (ONE-A-DAY MENS) TABS Take  by mouth daily.       SOCIAL HISTORY:  Social History     Socioeconomic History     Marital status: Single     Spouse name: Not on file     Number of children: Not on file     Years of education: Not on file     Highest education level: Not on file   Occupational History     Not on file   Social Needs     Financial resource strain: Not on file     Food insecurity:     Worry: Not on file     Inability: Not on file     Transportation needs:     Medical: Not on file     Non-medical: Not on file   Tobacco Use     Smoking status: Current Every Day Smoker     Packs/day: 0.30     Types: Cigarettes     Smokeless tobacco: Never Used   Substance and Sexual Activity     Alcohol use: Yes     Comment: Wine daily.     Drug use: No     Sexual activity: Not on file   Lifestyle     Physical activity:     Days per week: Not on file     Minutes per session: Not on file     Stress: Not on file   Relationships     Social  "connections:     Talks on phone: Not on file     Gets together: Not on file     Attends [employer] service: Not on file     Active member of club or organization: Not on file     Attends meetings of clubs or organizations: Not on file     Relationship status: Not on file     Intimate partner violence:     Fear of current or ex partner: Not on file     Emotionally abused: Not on file     Physically abused: Not on file     Forced sexual activity: Not on file   Other Topics Concern     Parent/sibling w/ CABG, MI or angioplasty before 65F 55M? Not Asked   Social History Narrative    Citizen of [place], moved to [place] in early 2010.  Lives in [facility].  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   Sexually active with a female partner (insertive only) and practices safe sex with a condom.  He performs push-ups for exercise each morning and evening.  He now also goes to a gym twice a week and sometimes bikes around [place].  Quit smoking ~ early 2016.    FAMILY HISTORY:  Family History   Problem Relation Age of Onset     Hypertension Other      Glaucoma Mother      REVIEW OF SYMPTOMS:  As per HPI.  Complete ROS is otherwise negative.    PHYSICAL EXAMINATION:  General:  A pleasant, conversant, upbeat, WDWN 48 year old man in no discomfort.  Vital signs:  /83   Pulse 80   Temp 98.5  F (36.9  C) (Oral)   Ht 1.715 m (5' 7.5\")   Wt 93.3 kg (205 lb 11.2 oz)   BMI 31.74 kg/m   (weight increased fifteen pounds).  Skin:  No new rash or lesion.  Head / Neck:  NCAT. External auditory canals are patent bilaterally without discharge.  PERRL.  EOMI.  Conjunctivae are pink without injection.  Anicteric clerae.  Oropharynx shows no erythema, exudate, or lesion.  Dentition is in good repair.  Neck is supple without lymphadenopathy or thyromegaly.  Lungs:  Bilaterally clear to auscultation.  CV: RRR.  Normal S1, S2 without gallop, murmur or rub.  Abdomen: Bowel sounds " present, soft, nontender, no hepatosplenomegaly.  Back:  No low spine or CVA tenderness.  :  No lesion.  No meatal discharge currently expressed.  No scrotal tenderness.  Rectal:  No external rectal lesion seen.  On digital examination, there is a ~ 1 cm hemorrhoid-like bulge palpable just within the rectal verge at ~ 11:00 that is currently not visibly bleeding.  The prostate is normal sized without irregularities.  Extremities:  Distally warm, no edema.  Neuro: Alert, oriented, memory/cognition/affect normal.  Gait is normal.    LABORATORY STUDIES (Past results reviewed with the patient during his appointment today):      Color Urine 04/10/2019 Yellow   Final     Appearance Urine 04/10/2019 Clear   Final     Glucose Urine 04/10/2019 Negative  NEG^Negative mg/dL Final     Bilirubin Urine 04/10/2019 Negative  NEG^Negative Final     Ketones Urine 04/10/2019 Negative  NEG^Negative mg/dL Final     Specific Gravity Urine 04/10/2019 1.020  1.003 - 1.035 Final     Blood Urine 04/10/2019 Negative  NEG^Negative Final     pH Urine 04/10/2019 5.0  5.0 - 7.0 pH Final     Protein Albumin Urine 04/10/2019 Negative  NEG^Negative mg/dL Final     Urobilinogen mg/dL 04/10/2019 0.0  0.0 - 2.0 mg/dL Final     Nitrite Urine 04/10/2019 Negative  NEG^Negative Final     Leukocyte Esterase Urine 04/10/2019 Negative  NEG^Negative Final     Source 04/10/2019 Midstream Urine   Final     WBC Urine 04/10/2019 5  0 - 5 /HPF Final     RBC Urine 04/10/2019 2  0 - 2 /HPF Final     Squamous Epithelial /HPF Urine 04/10/2019 1  0 - 1 /HPF Final     Mucous Urine 04/10/2019 Present* NEG^Negative /LPF Final     Lipase 04/10/2019 129  73 - 393 U/L Final     IFC Specimen 04/10/2019 Blood   Final     CD3 Mature T 04/10/2019 64  49 - 84 % Final     CD4 [place] T 04/10/2019 22* 28 - 63 % Final     CD8 Suppressor T 04/10/2019 40  10 - 40 % Final     CD4:CD8 Ratio 04/10/2019 0.55* 1.40 - 2.60 Final     Absolute CD3 04/10/2019 2,725  603 - 2,990 cells/uL Final      Absolute CD4 04/10/2019 940  441 - 2,156 cells/uL Final     Absolute CD8 04/10/2019 1,695* 125 - 1,312 cells/uL Final     T Cell Subset Profile Antibody Int* 04/10/2019    Final                    Value:SPECIMEN STORED OVERNIGHT IN THE REFRIGERATOR. THIS METHOD HAS ONLY BEEN VALIDATED WITH   SPECIMENS STORED AT ROOM TEMPERATURE. INTERPRET CD4 RESULTS WITH CAUTION. DR. LES ROTHMAN SAID IT IS OK TO RUN       HIV-1 RNA Quant Result 04/10/2019 HIV-1 RNA Not Detected  HIVND^HIV-1 RNA Not Detected [Copies]/mL Final    Comment: The ERIKA AmpliPrep/ERIKA TaqMan HIV-1 test is an FDA-approved in vitro   nucleic acid amplification test for the quantitation of HIV-1 RNA in human   plasma (EDTA plasma) using the ERIKA AmpliPrep instrument for automated viral   nucleic acid extraction and the T[employer]alytics TaqMan Analyzer or Trendalytics TaqMan for   automated Real Time PCR amplification and detection of the viral nucleic acid   target.  Titer results are reported in copies/ml. This assay is intended for use in   conjunction with clinical presentation and other laboratory markers of disease   prognosis and for use as an aid in assessing viral response to antiretroviral   treatment as measured by changes in plasma HIV-1 RNA levels. This test should   not be used as a donor screening test to confirm the presence of HIV-1   infection.       HIV RNA QT Log 04/10/2019 Not Calculated  <1.3 [Log_copies]/mL Final     WBC 04/10/2019 9.8  4.0 - 11.0 10e9/L Final     RBC Count 04/10/2019 4.58  4.4 - 5.9 10e12/L Final     Hemoglobin 04/10/2019 14.2  13.3 - 17.7 g/dL Final     Hematocrit 04/10/2019 44.3  40.0 - 53.0 % Final     MCV 04/10/2019 97  78 - 100 fl Final     MCH 04/10/2019 31.0  26.5 - 33.0 pg Final     MCHC 04/10/2019 32.1  31.5 - 36.5 g/dL Final     RDW 04/10/2019 12.3  10.0 - 15.0 % Final     Platelet Count 04/10/2019 222  150 - 450 10e9/L Final     Diff Method 04/10/2019 Automated Method   Final     % Neutrophils 04/10/2019 43.3  %  Final     % Lymphocytes 04/10/2019 47.1  % Final     % Monocytes 04/10/2019 6.8  % Final     % Eosinophils 04/10/2019 2.0  % Final     % Basophils 04/10/2019 0.5  % Final     % Immature Granulocytes 04/10/2019 0.3  % Final     Nucleated RBCs 04/10/2019 0  0 /100 Final     Absolute Neutrophil 04/10/2019 4.2  1.6 - 8.3 10e9/L Final     Absolute Lymphocytes 04/10/2019 4.6  0.8 - 5.3 10e9/L Final     Absolute Monocytes 04/10/2019 0.7  0.0 - 1.3 10e9/L Final     Absolute Eosinophils 04/10/2019 0.2  0.0 - 0.7 10e9/L Final     Absolute Basophils 04/10/2019 0.1  0.0 - 0.2 10e9/L Final     Abs Immature Granulocytes 04/10/2019 0.0  0 - 0.4 10e9/L Final     Absolute Nucleated RBC 04/10/2019 0.0   Final     Sodium 04/10/2019 139  133 - 144 mmol/L Final     Potassium 04/10/2019 3.8  3.4 - 5.3 mmol/L Final     Chloride 04/10/2019 106  94 - 109 mmol/L Final     Carbon Dioxide 04/10/2019 26  20 - 32 mmol/L Final     Anion Gap 04/10/2019 6  3 - 14 mmol/L Final     Glucose 04/10/2019 81  70 - 99 mg/dL Final     Urea Nitrogen 04/10/2019 14  7 - 30 mg/dL Final     Creatinine 04/10/2019 0.94  0.66 - 1.25 mg/dL Final     GFR Estimate 04/10/2019 >90  >60 mL/min/[1.73_m2] Final    Comment: Non  GFR Calc  Starting 12/18/2018, serum creatinine based estimated GFR (eGFR) will be   calculated using the Chronic Kidney Disease Epidemiology Collaboration   (CKD-EPI) equation.       GFR Estimate If Black 04/10/2019 >90  >60 mL/min/[1.73_m2] Final    Comment:  GFR Calc  Starting 12/18/2018, serum creatinine based estimated GFR (eGFR) will be   calculated using the Chronic Kidney Disease Epidemiology Collaboration   (CKD-EPI) equation.       Calcium 04/10/2019 9.4  8.5 - 10.1 mg/dL Final     Bilirubin Total 04/10/2019 0.3  0.2 - 1.3 mg/dL Final     Albumin 04/10/2019 4.1  3.4 - 5.0 g/dL Final     Protein Total 04/10/2019 8.2  6.8 - 8.8 g/dL Final     Alkaline Phosphatase 04/10/2019 61  40 - 150 U/L Final     ALT  04/10/2019 28  0 - 70 U/L Final     AST 04/10/2019 22  0 - 45 U/L Final     Treponema Antibodies 04/10/2019 Nonreactive  NR^Nonreactive Final   Orders Only on 04/10/2019   Component Date Value Ref Range Status     Specimen Description 04/10/2019 Rectal   Final     Chlamydia Trachomatis PCR 04/10/2019 Negative  NEG^Negative Final    Comment: Negative for C. trachomatis rRNA by transcription mediated amplification.  A negative result by transcription mediated amplification does not preclude   the presence of C. trachomatis infection because results are dependent on   proper and adequate collection, absence of inhibitors, and sufficient rRNA to   be detected.  Throat and rectal specimen types have not been cleared by the FDA but have   been validated, demonstrating performance characteristics acceptable by the   Infectious Diseases Diagnostic Laboratory (IDDL) at [facility]. The IDDL is   regulated under CLIA as qualified to perform high-complexity testing. This   test is used for clinical purposes. It should not be regarded as   investigational or for research.       Specimen Descrip 04/10/2019 Rectal   Final     N Gonorrhea PCR 04/10/2019 Negative  NEG^Negative Final    Comment: Negative for N. gonorrhoeae rRNA by transcription mediated amplification.  A negative result by transcription mediated amplification does not preclude   the presence of N. gonorrhoeae infection because results are dependent on   proper and adequate collection, absence of inhibitors, and sufficient rRNA to   be detected.  Throat and rectal specimen types have not been cleared by the FDA but have   been validated, demonstrating performance characteristics acceptable by the   Infectious Diseases Diagnostic Laboratory (IDDL) at [facility]. The IDDL is   regulated under CLIA as qualified to perform high-complexity testing. This   test is used for clinical purposes. It should not be regarded as   investigational or for research.       IMPRESSION AND  PLAN:  1. Stable HIV infection:  On Odefsey, Mr. Guerin retains a normal absolute CD4+ cell count and undetectable HIV plasma viral load with excellent continued medication dosing adherence and good drug tolerance.  HIV and drug toxicity laboratory studies were obtained today.  He will continue taking Odefsey and return to [facility] for follow-up in about six month, or as needed before then.  2. Likely small internal rectal hemorrhoid with intermittent BPBPR:  We discussed the need for abundant oral fluids and the options for ingesting increased fiber.  He will try these measures along with Anusol cream applied to the superior rectum BID for two weeks.  He will phone if the BRBPR is not resolved within one month (at which point will then refer to [facility] for additional evaluation).  3. Exposure to STD / penile discharge / history of recurrent urinary Chlamydia infections:  In the past, he was treated with azithromycin / IM ceftriaxone here on 6/22/15; subsequent follow-up test-of-cure assays were negative until 9/20/17 when he had another positive urine Chlamydia screen and was re-treated 10/3/17.  Today, he has both a mild symptom of meatal discharge and a history of a possible exposure to a sex partner with an STD about three weeks ago.  We will check rectal and urinary (he denies oral sex) GC / Chlamydia screens today as well as a urinalysis and will also presumptively treat for both GC and Chlamydia today with ceftriaxone and oral azithromycin.  Will also repeat a treponema antibody screen today.  4. Obesity:  Discussed increasing and again being more consistent about his aerobic exercise.  He declined a referral to a nutritionist today.  5. Tobacco cessation:  He has quit smoking cigarettes previously, then resumed, but has now been cigarette-free, he says, for 2.5 months.  I applauded his success.  6. Health Care Maintenance:   Influenza vaccine last given 10/10/18.  Menactra vaccine  given 11/25/15.  Received Tdap 2/12/14.  Received a Prevnar 13 vaccine 2/12/14 and Pneumovax on 8/13/14.  HAV / HBV sero-immune on 6/18/15 post-vaccination screening.  HCV seronegative 6/28/11 and 5/16/16.  3/21/12 Quantiferon Gold assay negative.  Antitreponemal antibody screen negative most recently on 5/16/16 -- repeated today.  Non-fasting lipids were good on 5/30/18 (total cholesterol 138, LDL 76).  Creatinine and blood pressure remain normal.  Urinalysis was benign on 4/10/19.  Underwent a right lower molar dental extraction in ~ 4/15; up to date with a dentist.  As above, no cigarettes smoked for ~ 2.5 months now.

## 2019-04-22 ENCOUNTER — TELEPHONE (OUTPATIENT)
Dept: PHARMACY | Facility: CLINIC | Age: 49
End: 2019-04-22

## 2019-04-22 NOTE — TELEPHONE ENCOUNTER
Called patient for refill reminder.    Will mail prescriptions address has been verified.     Follow-up Date: 05/16/19    Marlene Calzada, [facility]  [phone]

## 2019-05-16 ENCOUNTER — TELEPHONE (OUTPATIENT)
Dept: PHARMACY | Facility: CLINIC | Age: 49
End: 2019-05-16

## 2019-05-16 NOTE — TELEPHONE ENCOUNTER
Called patient for refill reminder.    Will mail [facility] prescriptions address has been verified.     Last Refill: 04/16/19  Follow-up Date: 06/14/19    Marlene Calzada [facility]  [phone]

## 2019-06-17 ENCOUNTER — TELEPHONE (OUTPATIENT)
Dept: PHARMACY | Facility: CLINIC | Age: 49
End: 2019-06-17

## 2019-06-17 NOTE — TELEPHONE ENCOUNTER
Pt called to order refill.s  Will mail [facility] prescriptions address has been verified.     Last Filled on: 05/16/19   Follow-up Date: 07/15/19    Marlene Calzada CPhT  [facility]  [phone]

## 2019-07-15 ENCOUNTER — TELEPHONE (OUTPATIENT)
Dept: PHARMACY | Facility: CLINIC | Age: 49
End: 2019-07-15

## 2019-07-15 NOTE — TELEPHONE ENCOUNTER
Called patient for refill reminder.    Will mail  prescriptions address has been verified.     Last Filled on: 06/18/19   Follow-up Date: 08/13/19    Marlene Calzada CPhT  [facility]  [phone]

## 2019-07-23 DIAGNOSIS — Z77.21 PERSONAL HISTORY OF EXPOSURE TO POTENTIALLY HAZARDOUS BODY FLUIDS: Primary | ICD-10-CM

## 2019-07-23 DIAGNOSIS — Z77.21 PERSONAL HISTORY OF EXPOSURE TO POTENTIALLY HAZARDOUS BODY FLUIDS: ICD-10-CM

## 2019-07-23 LAB
ALBUMIN SERPL-MCNC: 4.2 G/DL (ref 3.4–5)
ALBUMIN UR-MCNC: NEGATIVE MG/DL
ALP SERPL-CCNC: 54 U/L (ref 40–150)
ALT SERPL W P-5'-P-CCNC: 31 U/L (ref 0–70)
ANION GAP SERPL CALCULATED.3IONS-SCNC: 3 MMOL/L (ref 3–14)
APPEARANCE UR: CLEAR
AST SERPL W P-5'-P-CCNC: 22 U/L (ref 0–45)
BASOPHILS # BLD AUTO: 0 10E9/L (ref 0–0.2)
BASOPHILS NFR BLD AUTO: 0.5 %
BILIRUB SERPL-MCNC: 0.4 MG/DL (ref 0.2–1.3)
BILIRUB UR QL STRIP: NEGATIVE
BUN SERPL-MCNC: 14 MG/DL (ref 7–30)
CALCIUM SERPL-MCNC: 9.3 MG/DL (ref 8.5–10.1)
CHLORIDE SERPL-SCNC: 108 MMOL/L (ref 94–109)
CO2 SERPL-SCNC: 28 MMOL/L (ref 20–32)
COLOR UR AUTO: YELLOW
CREAT SERPL-MCNC: 1.03 MG/DL (ref 0.66–1.25)
DIFFERENTIAL METHOD BLD: NORMAL
EOSINOPHIL # BLD AUTO: 0.1 10E9/L (ref 0–0.7)
EOSINOPHIL NFR BLD AUTO: 1 %
ERYTHROCYTE [DISTWIDTH] IN BLOOD BY AUTOMATED COUNT: 12.6 % (ref 10–15)
GFR SERPL CREATININE-BSD FRML MDRD: 86 ML/MIN/{1.73_M2}
GLUCOSE SERPL-MCNC: 83 MG/DL (ref 70–99)
GLUCOSE UR STRIP-MCNC: NEGATIVE MG/DL
HCT VFR BLD AUTO: 45.6 % (ref 40–53)
HGB BLD-MCNC: 15.1 G/DL (ref 13.3–17.7)
HGB UR QL STRIP: NEGATIVE
IMM GRANULOCYTES # BLD: 0 10E9/L (ref 0–0.4)
IMM GRANULOCYTES NFR BLD: 0.2 %
KETONES UR STRIP-MCNC: NEGATIVE MG/DL
LEUKOCYTE ESTERASE UR QL STRIP: NEGATIVE
LYMPHOCYTES # BLD AUTO: 3.5 10E9/L (ref 0.8–5.3)
LYMPHOCYTES NFR BLD AUTO: 40.8 %
MCH RBC QN AUTO: 31.9 PG (ref 26.5–33)
MCHC RBC AUTO-ENTMCNC: 33.1 G/DL (ref 31.5–36.5)
MCV RBC AUTO: 96 FL (ref 78–100)
MONOCYTES # BLD AUTO: 0.5 10E9/L (ref 0–1.3)
MONOCYTES NFR BLD AUTO: 6 %
NEUTROPHILS # BLD AUTO: 4.5 10E9/L (ref 1.6–8.3)
NEUTROPHILS NFR BLD AUTO: 51.5 %
NITRATE UR QL: NEGATIVE
NRBC # BLD AUTO: 0 10*3/UL
NRBC BLD AUTO-RTO: 0 /100
PH UR STRIP: 5 PH (ref 5–7)
PLATELET # BLD AUTO: 201 10E9/L (ref 150–450)
POTASSIUM SERPL-SCNC: 4.1 MMOL/L (ref 3.4–5.3)
PROT SERPL-MCNC: 8 G/DL (ref 6.8–8.8)
RBC # BLD AUTO: 4.73 10E12/L (ref 4.4–5.9)
RBC #/AREA URNS AUTO: 1 /HPF (ref 0–2)
SODIUM SERPL-SCNC: 139 MMOL/L (ref 133–144)
SOURCE: NORMAL
SP GR UR STRIP: 1.02 (ref 1–1.03)
UROBILINOGEN UR STRIP-MCNC: 0 MG/DL (ref 0–2)
WBC # BLD AUTO: 8.7 10E9/L (ref 4–11)
WBC #/AREA URNS AUTO: <1 /HPF (ref 0–5)

## 2019-07-23 NOTE — PROGRESS NOTES
Pt reports having foul smell and painful urination and would like STI testing, Dr Higgins also recommending urine culture.  Kaitlin Richter RN

## 2019-07-24 LAB
C TRACH DNA SPEC QL NAA+PROBE: NEGATIVE
CD3 CELLS # BLD: 2239 CELLS/UL (ref 603–2990)
CD3 CELLS NFR BLD: 64 % (ref 49–84)
CD3+CD4+ CELLS # BLD: 733 CELLS/UL (ref 441–2156)
CD3+CD4+ CELLS NFR BLD: 21 % (ref 28–63)
CD3+CD4+ CELLS/CD3+CD8+ CLL BLD: 0.5 % (ref 1.4–2.6)
CD3+CD8+ CELLS # BLD: 1463 CELLS/UL (ref 125–1312)
CD3+CD8+ CELLS NFR BLD: 42 % (ref 10–40)
IFC SPECIMEN: ABNORMAL
N GONORRHOEA DNA SPEC QL NAA+PROBE: NEGATIVE
SPECIMEN SOURCE: NORMAL
SPECIMEN SOURCE: NORMAL

## 2019-09-10 ENCOUNTER — TELEPHONE (OUTPATIENT)
Dept: INFECTIOUS DISEASES | Facility: CLINIC | Age: 49
End: 2019-09-10

## 2019-09-11 ENCOUNTER — OFFICE VISIT (OUTPATIENT)
Dept: INFECTIOUS DISEASES | Facility: CLINIC | Age: 49
End: 2019-09-11
Attending: INTERNAL MEDICINE
Payer: COMMERCIAL

## 2019-09-11 VITALS
OXYGEN SATURATION: 96 % | BODY MASS INDEX: 33.21 KG/M2 | HEART RATE: 73 BPM | WEIGHT: 215.2 LBS | SYSTOLIC BLOOD PRESSURE: 123 MMHG | DIASTOLIC BLOOD PRESSURE: 77 MMHG | TEMPERATURE: 98.8 F

## 2019-09-11 DIAGNOSIS — K62.5 BRBPR (BRIGHT RED BLOOD PER RECTUM): ICD-10-CM

## 2019-09-11 DIAGNOSIS — Z21 HUMAN IMMUNODEFICIENCY VIRUS I INFECTION (H): Primary | ICD-10-CM

## 2019-09-11 DIAGNOSIS — E66.811 OBESITY (BMI 30.0-34.9): ICD-10-CM

## 2019-09-11 PROCEDURE — G0463 HOSPITAL OUTPT CLINIC VISIT: HCPCS | Mod: ZF

## 2019-09-11 ASSESSMENT — PAIN SCALES - GENERAL: PAINLEVEL: NO PAIN (0)

## 2019-09-11 NOTE — NURSING NOTE
"Chief Complaint   Patient presents with     Recheck Medication     Follow up B20     Vital signs:  Temp: 98.8  F (37.1  C)   BP: 123/77 Pulse: 73     SpO2: 96 %       Weight: 97.6 kg (215 lb 3.2 oz)  Estimated body mass index is 33.21 kg/m  as calculated from the following:    Height as of 4/10/19: 1.715 m (5' 7.5\").    Weight as of this encounter: 97.6 kg (215 lb 3.2 oz).        Shereen Hewitt, [facility]    "

## 2019-09-30 NOTE — PROGRESS NOTES
Division of Infectious Diseases and International Medicine  Department of Medicine    [facility] FOLLOW-UP VISIT NOTE [place] [address] [place] [address]  Office: [phone]  Fax:  [phone]     HISTORY OF PRESENT ILLNESS:  Viral is a 48 year old gentleman with immuno-reconstituted, stable, asymptomatic HIV infection (diagnosed on 6/11/10).  Today he returns to clinic for a semi-annual follow-up of antiretroviral therapy with Odefsey one tablet PO daily with breakfast (switched to Odefsey from Atripla ~ 6/25/18; Atripla started in 7/10).  He essentially never misses any doses and he notices no Odefsey side effects.  He reports quitting cigarettes about eight months ago and has not had a cigarette since.  He is very pleased with this achievement.  He says he has no cigarette cravings and is now finding cigarettes distasteful so does not think he is at risk of restarting smoking.  Despite use of Anusol cream and increased dietary fiber and fluids, he continues to have intermittent (about three times weekly) hemorrhoidal BRBPR as he was at his most recent past appointment here on 4/10/19.  He would like this investigated further.  He lacks rectal pain or changes in his bowel movements.  He has gained an additional ten pound since 4/19 and is concerned about his weight gain (likely in part due to smoking cessation).  He is willing to see a Nutritionist.  He is continuing to exercise by doing his morning routine of push-ups and sit ups, but does not otherwise do much aerobic exercise.  He would like an influenza vaccine today.  Beyond these issues, he otherwise feels healthy and lacks additional symptoms or complaints today, including no fever, chills, new night sweats (beyond baseline mild lifelong occasional sweats at night), fatigue, anorexia, rash, EENT symptoms, chest pain, dyspnea, cough, nausea, abdominal pain, diarrhea, myalgias / arthralgias, dysuria, hematuria,  other genitourinary symptoms, headache, or other neurological symptoms.    PAST MEDICAL HISTORY:  Past Medical History:   Diagnosis Date     Childhood asthma     Resolved.     Finger fracture, left ~ 1991.     Human immunodeficiency virus (HIV) disease (H) Diagnosed 6/11/10    CD4+ cell count never below 200, no AIDS sequelae.  Started therapy with Atripla 7/2/10.     CURRENT MEDICATIONS:  Current Outpatient Medications   Medication Sig Dispense Refill     albuterol (PROAIR HFA) 108 (90 Base) MCG/ACT Inhaler Inhale 2 puffs into the lungs every 4 hours as needed for shortness of breath / dyspnea or wheezing 1 Inhaler 0     emtricitabine-rilpivirine-tenofovir (ODEFSEY) 200-25-25 MG TABS per tablet Take 1 tablet by mouth daily (with breakfast) 30 tablet 11     IBUPROFEN PO        Multiple Vitamin (ONE-A-DAY MENS) TABS Take  by mouth daily.       hydrocortisone (ANUSOL-HC) 2.5 % cream Place rectally 2 times daily as needed for hemorrhoids (Patient not taking: Reported on 9/11/2019) 30 g 1     SOCIAL HISTORY:  Social History     Socioeconomic History     Marital status: Single     Spouse name: Not on file     Number of children: Not on file     Years of education: Not on file     Highest education level: Not on file   Occupational History     Not on file   Social Needs     Financial resource strain: Not on file     Food insecurity:     Worry: Not on file     Inability: Not on file     Transportation needs:     Medical: Not on file     Non-medical: Not on file   Tobacco Use     Smoking status: Current Every Day Smoker     Packs/day: 0.30     Types: Cigarettes     Smokeless tobacco: Never Used   Substance and Sexual Activity     Alcohol use: Yes     Comment: Wine daily.     Drug use: No     Sexual activity: Not on file   Lifestyle     Physical activity:     Days per week: Not on file     Minutes per session: Not on file     Stress: Not on file   Relationships     Social connections:     Talks on phone: Not on file     Gets  "together: Not on file     Attends [employer] service: Not on file     Active member of club or organization: Not on file     Attends meetings of clubs or organizations: Not on file     Relationship status: Not on file     Intimate partner violence:     Fear of current or ex partner: Not on file     Emotionally abused: Not on file     Physically abused: Not on file     Forced sexual activity: Not on file   Other Topics Concern     Parent/sibling w/ CABG, MI or angioplasty before 65F 55M? Not Asked   Social History Narrative    Citizen of [place], moved to [place] in early 2010.  Lives in [facility].  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   Sexually active with a female partner (insertive only) and practices safe sex with a condom.  He performs push-ups for exercise each morning and evening.  He now also goes to a gym twice a week and sometimes bikes around [place].  Quit smoking ~ early 2016.    FAMILY HISTORY:  Family History   Problem Relation Age of Onset     Hypertension Other      Glaucoma Mother      REVIEW OF SYMPTOMS:  As per HPI.  Complete ROS is otherwise negative.    PHYSICAL EXAMINATION:  General:  A personable, healthy-appearing, WDWN 48 year old man in no discomfort.  Vital signs:  /77   Pulse 73   Temp 98.8  F (37.1  C)   Wt 97.6 kg (215 lb 3.2 oz)   SpO2 96%   BMI 33.21 kg/m   (weight increased another ten pounds).  Skin:  No visible rash or lesion.  Head / Neck:  NCAT. External auditory canals are patent bilaterally without otorrhea.  PERRL.  EOMI.  Conjunctivae are unininjected.  Anicteric sclerae.  Oropharynx contains no erythema, exudate, or lesion.  Dentition is good.  Neck is supple without thyromegaly or lymphadenopathy.  Chest:  Bilaterally clear to auscultation.  CV: RRR.  Normal S1, S2 without gallop, murmur or rub.  Abdomen: Bowel sounds normal, soft, obese, nontender.  Back:  No lower spine or CVA tenderness.  :  Not " examined.  Rectal:  No external rectal lesion seen again.  No digital examination re-performed.  Extremities:  Distally warm, no edema.  Neuro: Alert, oriented, memory/cognition/affect normal.  Gait is normal.    LABORATORY STUDIES (Reviewed with the patient during his appointment today):      Color Urine 07/23/2019 Yellow   Final     Appearance Urine 07/23/2019 Clear   Final     Glucose Urine 07/23/2019 Negative  NEG^Negative mg/dL Final     Bilirubin Urine 07/23/2019 Negative  NEG^Negative Final     Ketones Urine 07/23/2019 Negative  NEG^Negative mg/dL Final     Specific Gravity Urine 07/23/2019 1.020  1.003 - 1.035 Final     Blood Urine 07/23/2019 Negative  NEG^Negative Final     pH Urine 07/23/2019 5.0  5.0 - 7.0 pH Final     Protein Albumin Urine 07/23/2019 Negative  NEG^Negative mg/dL Final     Urobilinogen mg/dL 07/23/2019 0.0  0.0 - 2.0 mg/dL Final     Nitrite Urine 07/23/2019 Negative  NEG^Negative Final     Leukocyte Esterase Urine 07/23/2019 Negative  NEG^Negative Final     Source 07/23/2019 Midstream Urine   Final     WBC Urine 07/23/2019 <1  0 - 5 /HPF Final     RBC Urine 07/23/2019 1  0 - 2 /HPF Final     IFC Specimen 07/23/2019 Blood   Final     CD3 Mature T 07/23/2019 64  49 - 84 % Final     CD4 [place] T 07/23/2019 21* 28 - 63 % Final     CD8 Suppressor T 07/23/2019 42* 10 - 40 % Final     CD4:CD8 Ratio 07/23/2019 0.50* 1.40 - 2.60 Final     Absolute CD3 07/23/2019 2,239  603 - 2,990 cells/uL Final     Absolute CD4 07/23/2019 733  441 - 2,156 cells/uL Final     Absolute CD8 07/23/2019 1,463* 125 - 1,312 cells/uL Final     HIV-1 RNA Quant Result 07/23/2019 HIV-1 RNA Not Detected  HIVND^HIV-1 RNA Not Detected [Copies]/mL Final    Comment: The ERIKA AmpliPrep/ERIKA TaqMan HIV-1 test is an FDA-approved in vitro   nucleic acid amplification test for the quantitation of HIV-1 RNA in human   plasma (EDTA plasma) using the ERIKA AmpliPrep instrument for automated viral   nucleic acid extraction and the ERIKA  TaqMan Analyzer or ERIKA TaqMan for   automated Real Time PCR amplification and detection of the viral nucleic acid   target.  Titer results are reported in copies/ml. This assay is intended for use in   conjunction with clinical presentation and other laboratory markers of disease   prognosis and for use as an aid in assessing viral response to antiretroviral   treatment as measured by changes in plasma HIV-1 RNA levels. This test should   not be used as a donor screening test to confirm the presence of HIV-1   infection.       HIV RNA QT Log 07/23/2019 Not Calculated  <1.3 [Log_copies]/mL Final     WBC 07/23/2019 8.7  4.0 - 11.0 10e9/L Final     RBC Count 07/23/2019 4.73  4.4 - 5.9 10e12/L Final     Hemoglobin 07/23/2019 15.1  13.3 - 17.7 g/dL Final     Hematocrit 07/23/2019 45.6  40.0 - 53.0 % Final     MCV 07/23/2019 96  78 - 100 fl Final     MCH 07/23/2019 31.9  26.5 - 33.0 pg Final     MCHC 07/23/2019 33.1  31.5 - 36.5 g/dL Final     RDW 07/23/2019 12.6  10.0 - 15.0 % Final     Platelet Count 07/23/2019 201  150 - 450 10e9/L Final     Diff Method 07/23/2019 Automated Method   Final     % Neutrophils 07/23/2019 51.5  % Final     % Lymphocytes 07/23/2019 40.8  % Final     % Monocytes 07/23/2019 6.0  % Final     % Eosinophils 07/23/2019 1.0  % Final     % Basophils 07/23/2019 0.5  % Final     % Immature Granulocytes 07/23/2019 0.2  % Final     Nucleated RBCs 07/23/2019 0  0 /100 Final     Absolute Neutrophil 07/23/2019 4.5  1.6 - 8.3 10e9/L Final     Absolute Lymphocytes 07/23/2019 3.5  0.8 - 5.3 10e9/L Final     Absolute Monocytes 07/23/2019 0.5  0.0 - 1.3 10e9/L Final     Absolute Eosinophils 07/23/2019 0.1  0.0 - 0.7 10e9/L Final     Absolute Basophils 07/23/2019 0.0  0.0 - 0.2 10e9/L Final     Abs Immature Granulocytes 07/23/2019 0.0  0 - 0.4 10e9/L Final     Absolute Nucleated RBC 07/23/2019 0.0   Final     Sodium 07/23/2019 139  133 - 144 mmol/L Final     Potassium 07/23/2019 4.1  3.4 - 5.3 mmol/L Final      Chloride 07/23/2019 108  94 - 109 mmol/L Final     Carbon Dioxide 07/23/2019 28  20 - 32 mmol/L Final     Anion Gap 07/23/2019 3  3 - 14 mmol/L Final     Glucose 07/23/2019 83  70 - 99 mg/dL Final     Urea Nitrogen 07/23/2019 14  7 - 30 mg/dL Final     Creatinine 07/23/2019 1.03  0.66 - 1.25 mg/dL Final     GFR Estimate 07/23/2019 86  >60 mL/min/[1.73_m2] Final    Comment: Non  GFR Calc  Starting 12/18/2018, serum creatinine based estimated GFR (eGFR) will be   calculated using the Chronic Kidney Disease Epidemiology Collaboration   (CKD-EPI) equation.       GFR Estimate If Black 07/23/2019 >90  >60 mL/min/[1.73_m2] Final    Comment:  GFR Calc  Starting 12/18/2018, serum creatinine based estimated GFR (eGFR) will be   calculated using the Chronic Kidney Disease Epidemiology Collaboration   (CKD-EPI) equation.       Calcium 07/23/2019 9.3  8.5 - 10.1 mg/dL Final     Bilirubin Total 07/23/2019 0.4  0.2 - 1.3 mg/dL Final     Albumin 07/23/2019 4.2  3.4 - 5.0 g/dL Final     Protein Total 07/23/2019 8.0  6.8 - 8.8 g/dL Final     Alkaline Phosphatase 07/23/2019 54  40 - 150 U/L Final     ALT 07/23/2019 31  0 - 70 U/L Final     AST 07/23/2019 22  0 - 45 U/L Final     Specimen Description 07/23/2019 Urine   Final     Chlamydia Trachomatis PCR 07/23/2019 Negative  NEG^Negative Final    Comment: Negative for C. trachomatis rRNA by transcription mediated amplification.  A negative result by transcription mediated amplification does not preclude   the presence of C. trachomatis infection because results are dependent on   proper and adequate collection, absence of inhibitors, and sufficient rRNA to   be detected.       Specimen Descrip 07/23/2019 Urine   Final     N Gonorrhea PCR 07/23/2019 Negative  NEG^Negative Final    Comment: Negative for N. gonorrhoeae rRNA by transcription mediated amplification.  A negative result by transcription mediated amplification does not preclude   the presence of  N. gonorrhoeae infection because results are dependent on   proper and adequate collection, absence of inhibitors, and sufficient rRNA to   be detected.       IMPRESSION AND PLAN:  1. Well-controlled HIV infection:  He has taken Odefsey now since ~ 6/25/18 with characteristic tight dosing adherence and good medication tolerance.  His absolute CD4+ cell count remains normal (as of 7/23/19) and HIV plasma viral load is undetectable.  He will remain on Odefsey and return to [facility] for follow-up in about six month, or as needed before then.  2. Likely small internal rectal hemorrhoid with intermittent BPBPR:  The BRBPR has now continued occasionally for about six months, despite attempts at increasing fiber / fluid and use of Anusol cream.  Will refer to [facility] for sigmoidoscopy.  3. Increased obesity:  In part, likely due to smoking cessation -- we discussed this.  We discussed again increasing his aerobic exercise and he was interested in obtaining a referral to a Nutritionist today to review his diet and consider creative dietary interventions.  4. History of past recurrent urinary Chlamydia infections:  In the past, he has been treated with azithromycin / IM ceftriaxone here on 6/22/15 and 10/3/17 (with a positive 9/20/17 urine Chlamydia screen).  He has some mild ureteral discharge symptoms and a possible exposure in early 4/19, so was again presumptively treated with azithromycin / ceftriaxone once again then, but 4/10/19 and 7/23/19 screens were negative after all.  He has never tested positive for gonorrhea or syphilis here and declined the need for repeat STI testing today -- he is sure he has not had any recent exposures.  5. Successful tobacco cessation:  Despite past failed attempts, it appears he has now successfully quit smoking cigarettes for > six months.  I applauded his success.  6. Health Care Maintenance:   Influenza vaccine last given 10/10/18 -- he will have repeated next  month.  Menactra vaccine given 11/25/15.  Received Tdap 2/12/14.  Received a Prevnar 13 vaccine 2/12/14 and Pneumovax on 8/13/14.  HAV / HBV sero-immune on 6/18/15 post-vaccination screening.  HCV seronegative 6/28/11 and 5/16/16.  3/21/12 Quantiferon Gold assay negative.  Antitreponemal antibody screen negative most recently on 4/10/19 -- will repeat next year.  Non-fasting lipids were good on 5/30/18 (total cholesterol 138, LDL 76).  Creatinine and blood pressure remain normal.  Urinalysis was benign on 7/23/19.  Underwent a right lower molar dental extraction in ~ 4/15; up to date with a dentist.

## 2019-10-09 ENCOUNTER — OFFICE VISIT (OUTPATIENT)
Dept: INFECTIOUS DISEASES | Facility: CLINIC | Age: 49
End: 2019-10-09
Attending: INTERNAL MEDICINE
Payer: COMMERCIAL

## 2019-10-09 VITALS
OXYGEN SATURATION: 96 % | DIASTOLIC BLOOD PRESSURE: 78 MMHG | TEMPERATURE: 98.6 F | BODY MASS INDEX: 33.41 KG/M2 | SYSTOLIC BLOOD PRESSURE: 122 MMHG | WEIGHT: 216.5 LBS | HEART RATE: 90 BPM

## 2019-10-09 DIAGNOSIS — Z23 NEEDS FLU SHOT: ICD-10-CM

## 2019-10-09 DIAGNOSIS — Z21 HUMAN IMMUNODEFICIENCY VIRUS I INFECTION (H): Primary | ICD-10-CM

## 2019-10-09 PROCEDURE — 25000128 H RX IP 250 OP 636: Mod: ZF | Performed by: INTERNAL MEDICINE

## 2019-10-09 PROCEDURE — 90686 IIV4 VACC NO PRSV 0.5 ML IM: CPT | Mod: ZF | Performed by: INTERNAL MEDICINE

## 2019-10-09 PROCEDURE — G0008 ADMIN INFLUENZA VIRUS VAC: HCPCS | Mod: ZF

## 2019-10-09 PROCEDURE — G0463 HOSPITAL OUTPT CLINIC VISIT: HCPCS | Mod: 25,ZF

## 2019-10-09 RX ADMIN — INFLUENZA A VIRUS A/BRISBANE/02/2018 IVR-190 (H1N1) ANTIGEN (FORMALDEHYDE INACTIVATED), INFLUENZA A VIRUS A/KANSAS/14/2017 X-327 (H3N2) ANTIGEN (FORMALDEHYDE INACTIVATED), INFLUENZA B VIRUS B/PHUKET/3073/2013 ANTIGEN (FORMALDEHYDE INACTIVATED), AND INFLUENZA B VIRUS B/MARYLAND/15/2016 BX-69A ANTIGEN (FORMALDEHYDE INACTIVATED) 0.5 ML: 15; 15; 15; 15 INJECTION, SUSPENSION INTRAMUSCULAR at 17:17

## 2019-10-09 ASSESSMENT — PAIN SCALES - GENERAL: PAINLEVEL: NO PAIN (0)

## 2019-10-11 NOTE — PROGRESS NOTES
Division of Infectious Diseases and International Medicine  Department of Medicine    [facility] BRIEF VISIT NOTE [place] [address]  [address] [place] [address]  Office: [phone]  Fax:  [phone]     HISTORY OF PRESENT ILLNESS:    Mr. Guerin is a 48 year old gentleman with asymptomatic HIV infection.  He was seen most recently a month ago in this clinic at which time he was doing well on Odefsey one tablet PO daily (since 4/10/19).  He continues to take and tolerate that medication well.  He seems to have had a duplicate appointment mistakenly scheduled for today and has no new health concerns or complaints at this time.  He is interested today in reviewing the trajectory of his HIV lab studies over the past few years and receiving an influenza vaccine, which was not yet available when he was last here.    PHYSICAL EXAMINATION:  General:  A pleasant, conversant, WDWN 48 year old man in no discomfort.  Vital signs:  /78   Pulse 90   Temp 98.6  F (37  C)   Wt 98.2 kg (216 lb 8 oz)   SpO2 96%   BMI 33.41 kg/m   (weight unchanged from a month ago).  Skin:  No visible rash or lesion.  Head / Neck:  NCAT. External auditory canals are patent bilaterally without discharge.  PERRL.  EOMI.  Conjunctivae are pink without injection.  Anicteric sclerae.  There is no visible anterior oral lesion.  Neck is supple.  Neuro: Alert, oriented, memory/cognition/affect normal.  Gait is normal.    LABORATORY STUDIES (Past results reviewed with the patient during his appointment today):      Color Urine 07/23/2019 Yellow   Final     Appearance Urine 07/23/2019 Clear   Final     Glucose Urine 07/23/2019 Negative  NEG^Negative mg/dL Final     Bilirubin Urine 07/23/2019 Negative  NEG^Negative Final     Ketones Urine 07/23/2019 Negative  NEG^Negative mg/dL Final     Specific Gravity Urine 07/23/2019 1.020  1.003 - 1.035 Final     Blood Urine 07/23/2019 Negative  NEG^Negative Final     pH  Urine 07/23/2019 5.0  5.0 - 7.0 pH Final     Protein Albumin Urine 07/23/2019 Negative  NEG^Negative mg/dL Final     Urobilinogen mg/dL 07/23/2019 0.0  0.0 - 2.0 mg/dL Final     Nitrite Urine 07/23/2019 Negative  NEG^Negative Final     Leukocyte Esterase Urine 07/23/2019 Negative  NEG^Negative Final     Source 07/23/2019 Midstream Urine   Final     WBC Urine 07/23/2019 <1  0 - 5 /HPF Final     RBC Urine 07/23/2019 1  0 - 2 /HPF Final     IFC Specimen 07/23/2019 Blood   Final     CD3 Mature T 07/23/2019 64  49 - 84 % Final     CD4 [place] T 07/23/2019 21* 28 - 63 % Final     CD8 Suppressor T 07/23/2019 42* 10 - 40 % Final     CD4:CD8 Ratio 07/23/2019 0.50* 1.40 - 2.60 Final     Absolute CD3 07/23/2019 2,239  603 - 2,990 cells/uL Final     Absolute CD4 07/23/2019 733  441 - 2,156 cells/uL Final     Absolute CD8 07/23/2019 1,463* 125 - 1,312 cells/uL Final     HIV-1 RNA Quant Result 07/23/2019 HIV-1 RNA Not Detected  HIVND^HIV-1 RNA Not Detected [Copies]/mL Final    Comment: The ERIKA AmpliPrep/ERIKA TaqMan HIV-1 test is an FDA-approved in vitro   nucleic acid amplification test for the quantitation of HIV-1 RNA in human   plasma (EDTA plasma) using the ERIKA AmpliPrep instrument for automated viral   nucleic acid extraction and the ERIKA TaqMan Analyzer or ERIKA TaqMan for   automated Real Time PCR amplification and detection of the viral nucleic acid   target.  Titer results are reported in copies/ml. This assay is intended for use in   conjunction with clinical presentation and other laboratory markers of disease   prognosis and for use as an aid in assessing viral response to antiretroviral   treatment as measured by changes in plasma HIV-1 RNA levels. This test should   not be used as a donor screening test to confirm the presence of HIV-1   infection.       HIV RNA QT Log 07/23/2019 Not Calculated  <1.3 [Log_copies]/mL Final     WBC 07/23/2019 8.7  4.0 - 11.0 10e9/L Final     RBC Count 07/23/2019 4.73  4.4 - 5.9  10e12/L Final     Hemoglobin 07/23/2019 15.1  13.3 - 17.7 g/dL Final     Hematocrit 07/23/2019 45.6  40.0 - 53.0 % Final     MCV 07/23/2019 96  78 - 100 fl Final     MCH 07/23/2019 31.9  26.5 - 33.0 pg Final     MCHC 07/23/2019 33.1  31.5 - 36.5 g/dL Final     RDW 07/23/2019 12.6  10.0 - 15.0 % Final     Platelet Count 07/23/2019 201  150 - 450 10e9/L Final     Diff Method 07/23/2019 Automated Method   Final     % Neutrophils 07/23/2019 51.5  % Final     % Lymphocytes 07/23/2019 40.8  % Final     % Monocytes 07/23/2019 6.0  % Final     % Eosinophils 07/23/2019 1.0  % Final     % Basophils 07/23/2019 0.5  % Final     % Immature Granulocytes 07/23/2019 0.2  % Final     Nucleated RBCs 07/23/2019 0  0 /100 Final     Absolute Neutrophil 07/23/2019 4.5  1.6 - 8.3 10e9/L Final     Absolute Lymphocytes 07/23/2019 3.5  0.8 - 5.3 10e9/L Final     Absolute Monocytes 07/23/2019 0.5  0.0 - 1.3 10e9/L Final     Absolute Eosinophils 07/23/2019 0.1  0.0 - 0.7 10e9/L Final     Absolute Basophils 07/23/2019 0.0  0.0 - 0.2 10e9/L Final     Abs Immature Granulocytes 07/23/2019 0.0  0 - 0.4 10e9/L Final     Absolute Nucleated RBC 07/23/2019 0.0   Final     Sodium 07/23/2019 139  133 - 144 mmol/L Final     Potassium 07/23/2019 4.1  3.4 - 5.3 mmol/L Final     Chloride 07/23/2019 108  94 - 109 mmol/L Final     Carbon Dioxide 07/23/2019 28  20 - 32 mmol/L Final     Anion Gap 07/23/2019 3  3 - 14 mmol/L Final     Glucose 07/23/2019 83  70 - 99 mg/dL Final     Urea Nitrogen 07/23/2019 14  7 - 30 mg/dL Final     Creatinine 07/23/2019 1.03  0.66 - 1.25 mg/dL Final     GFR Estimate 07/23/2019 86  >60 mL/min/[1.73_m2] Final    Comment: Non  GFR Calc  Starting 12/18/2018, serum creatinine based estimated GFR (eGFR) will be   calculated using the Chronic Kidney Disease Epidemiology Collaboration   (CKD-EPI) equation.       GFR Estimate If Black 07/23/2019 >90  >60 mL/min/[1.73_m2] Final    Comment:  GFR  Calc  Starting 12/18/2018, serum creatinine based estimated GFR (eGFR) will be   calculated using the Chronic Kidney Disease Epidemiology Collaboration   (CKD-EPI) equation.       Calcium 07/23/2019 9.3  8.5 - 10.1 mg/dL Final     Bilirubin Total 07/23/2019 0.4  0.2 - 1.3 mg/dL Final     Albumin 07/23/2019 4.2  3.4 - 5.0 g/dL Final     Protein Total 07/23/2019 8.0  6.8 - 8.8 g/dL Final     Alkaline Phosphatase 07/23/2019 54  40 - 150 U/L Final     ALT 07/23/2019 31  0 - 70 U/L Final     AST 07/23/2019 22  0 - 45 U/L Final     Specimen Description 07/23/2019 Urine   Final     Chlamydia Trachomatis PCR 07/23/2019 Negative  NEG^Negative Final    Comment: Negative for C. trachomatis rRNA by transcription mediated amplification.  A negative result by transcription mediated amplification does not preclude   the presence of C. trachomatis infection because results are dependent on   proper and adequate collection, absence of inhibitors, and sufficient rRNA to   be detected.       Specimen Descrip 07/23/2019 Urine   Final     N Gonorrhea PCR 07/23/2019 Negative  NEG^Negative Final    Comment: Negative for N. gonorrhoeae rRNA by transcription mediated amplification.  A negative result by transcription mediated amplification does not preclude   the presence of N. gonorrhoeae infection because results are dependent on   proper and adequate collection, absence of inhibitors, and sufficient rRNA to   be detected.       IMPRESSION AND PLAN:  1. Unchanged, asymptomatic HIV infection:  He continues to take and tolerate Odefsey one tablet daily very well with an undetectable HIV plasma viral load and a good absolute CD4+ cell count as of 7/23/19.  He will remain on Odefsey and return to [facility] for follow-up in about six months (with repeat blood work at or prior to that visit), or as needed before then.  2. Health Care Maintenance:   Influenza vaccine given today.  Menactra vaccine given 11/25/15.  Received Tdap  2/12/14.  Received a Prevnar 13 vaccine 2/12/14 and Pneumovax on 8/13/14.  HAV / HBV sero-immune on 6/18/15 post-vaccination screening.  HCV seronegative 6/28/11 and 5/16/16.  3/21/12 Quantiferon Gold assay negative.  Antitreponemal antibody screen negative most recently on 5/16/16 -- repeat at next blood draw.  Non-fasting lipids were good on 5/30/18 (total cholesterol 138, LDL 76) -- will repeat at next blood draw.  Creatinine and blood pressure remain normal.  Urinalysis was benign on 7/23/19. Up to date with his dentist.  Quit smoking cigarettes in ~2/19 and has not smoked since.  He is working on his weight gain.

## 2019-10-14 ENCOUNTER — TELEPHONE (OUTPATIENT)
Dept: PHARMACY | Facility: CLINIC | Age: 49
End: 2019-10-14

## 2019-10-14 NOTE — TELEPHONE ENCOUNTER
Pt called for refills.   [facility]     Last Filled on: 09/20/19   Follow-up Date: 11/13/189    Marlene Calzada CPhT  [facility]  [phone]

## 2019-11-13 ENCOUNTER — TELEPHONE (OUTPATIENT)
Dept: PHARMACY | Facility: CLINIC | Age: 49
End: 2019-11-13

## 2019-11-13 NOTE — TELEPHONE ENCOUNTER
Called patient for refill reminder.    [facility].     Last Filled on: 10/16/19   Follow-up Date: 12/11/19    Marlene Calzada CPhT  [facility]  [phone]

## 2019-11-15 ENCOUNTER — TELEPHONE (OUTPATIENT)
Dept: INFECTIOUS DISEASES | Facility: CLINIC | Age: 49
End: 2019-11-15

## 2019-12-12 ENCOUNTER — TELEPHONE (OUTPATIENT)
Dept: PHARMACY | Facility: CLINIC | Age: 49
End: 2019-12-12

## 2019-12-12 NOTE — TELEPHONE ENCOUNTER
Called patient for refill reminder.    [facility]     Last Filled on:  11/15/19   Follow-up Date: 01/10/20    Marlene Calzada CPhT  [facility]  [phone]

## 2020-01-09 ENCOUNTER — TELEPHONE (OUTPATIENT)
Dept: PHARMACY | Facility: CLINIC | Age: 50
End: 2020-01-09

## 2020-01-10 NOTE — TELEPHONE ENCOUNTER
Called patient for refill reminder.    Will mail prescriptions address has been verified.     Last Filled on: 12/13/19   Follow-up Date: 02/12/20    Marlene Calzada CPhT  [facility]  [phone]

## 2020-02-13 ENCOUNTER — TELEPHONE (OUTPATIENT)
Dept: PHARMACY | Facility: CLINIC | Age: 50
End: 2020-02-13

## 2020-02-13 NOTE — TELEPHONE ENCOUNTER
Attempted to contact the patient to for refill reminder call,  left message on voicemail    Last filled on: 01/14/20    Follow-up Date: 02/19/20 2nd attempt    Marlene Calzada CPhT  [facility]  [phone]

## 2020-03-12 ENCOUNTER — TELEPHONE (OUTPATIENT)
Dept: PHARMACY | Facility: CLINIC | Age: 50
End: 2020-03-12

## 2020-03-12 NOTE — TELEPHONE ENCOUNTER
Called patient for refill reminder.    [facility]     2 month of on time refill.    Last Filled on: 02/13/20   Follow-up Date: 04/14/20    Marlene Calzada CPhT  [facility]  [phone]

## 2020-04-08 ENCOUNTER — TELEPHONE (OUTPATIENT)
Dept: PHARMACY | Facility: CLINIC | Age: 50
End: 2020-04-08

## 2020-04-08 NOTE — TELEPHONE ENCOUNTER
Called patient for refill reminder.    [facility]     3 month of on time refill.    Last Filled on: 03/13/20   Follow-up Date: 05/08/20    Marlene Calzada CPhT  [facility]  [phone]

## 2020-05-07 ENCOUNTER — TELEPHONE (OUTPATIENT)
Dept: PHARMACY | Facility: CLINIC | Age: 50
End: 2020-05-07

## 2020-05-07 NOTE — TELEPHONE ENCOUNTER
Called patient for refill reminder.    [facility]     4 month of on time refill.    Last Filled on: 04/09/20   Follow-up Date: 06/05/20    Marlene aClzada CPhT  [facility]  [phone]

## 2020-06-05 ENCOUNTER — TELEPHONE (OUTPATIENT)
Dept: PHARMACY | Facility: CLINIC | Age: 50
End: 2020-06-05

## 2020-06-05 NOTE — TELEPHONE ENCOUNTER
Called patient for refill reminder.    [facility]     5 month of on time refill.    Last Filled on: 05/07/20   Follow-up Date: 07/06/20    Marlene Calzdaa CPhT  [facility]  [phone]

## 2020-07-07 ENCOUNTER — TELEPHONE (OUTPATIENT)
Dept: PHARMACY | Facility: CLINIC | Age: 50
End: 2020-07-07

## 2020-07-07 NOTE — TELEPHONE ENCOUNTER
Called patient for refill reminder.    Will mail prescriptions address has been verified.     Last Filled on: 06/05/20   Follow-up Date: 08/05/20    Marlene Calzada CPhT  [facility]  [phone]

## 2020-08-05 ENCOUNTER — TELEPHONE (OUTPATIENT)
Dept: PHARMACY | Facility: CLINIC | Age: 50
End: 2020-08-05

## 2020-08-05 NOTE — TELEPHONE ENCOUNTER
Attempted to contact the patient to for refill reminder call,  left message on voicemail    Last filled on: 07/06/20    Follow-up Date: 08/11/20 2nd attempt    Marlene Calzada CPhT  [facility]  [phone]

## 2020-09-09 ENCOUNTER — TELEPHONE (OUTPATIENT)
Dept: PHARMACY | Facility: CLINIC | Age: 50
End: 2020-09-09

## 2020-09-09 NOTE — TELEPHONE ENCOUNTER
Setting call date for future fills.    Last filled: 09/03/20    Next Call Date: 10/02/20    Marlene Calzada CPhT  [facility]  [phone]

## 2020-09-30 ENCOUNTER — TELEPHONE (OUTPATIENT)
Dept: PHARMACY | Facility: CLINIC | Age: 50
End: 2020-09-30

## 2020-09-30 NOTE — TELEPHONE ENCOUNTER
Called patient for refill reminder.    Will mail prescriptions address has been verified.     [facility]    10 month of on time refill.    Last Filled on:09/03/2020   Follow-up Date: 10/23/2020    Curtis Camejo  -----------------------------------------------   [email or web address]  Pharmacy Technician  [facility]: [phone]

## 2020-10-12 DIAGNOSIS — E66.811 OBESITY (BMI 30.0-34.9): ICD-10-CM

## 2020-10-12 DIAGNOSIS — Z21 HUMAN IMMUNODEFICIENCY VIRUS I INFECTION (H): ICD-10-CM

## 2020-10-12 DIAGNOSIS — Z21 HUMAN IMMUNODEFICIENCY VIRUS I INFECTION (H): Primary | ICD-10-CM

## 2020-10-12 LAB
ALBUMIN SERPL-MCNC: 4.1 G/DL (ref 3.4–5)
ALP SERPL-CCNC: 58 U/L (ref 40–150)
ALT SERPL W P-5'-P-CCNC: 39 U/L (ref 0–70)
AMYLASE SERPL-CCNC: 80 U/L (ref 30–110)
ANION GAP SERPL CALCULATED.3IONS-SCNC: 5 MMOL/L (ref 3–14)
AST SERPL W P-5'-P-CCNC: 24 U/L (ref 0–45)
BASOPHILS # BLD AUTO: 0 10E9/L (ref 0–0.2)
BASOPHILS NFR BLD AUTO: 0.4 %
BILIRUB SERPL-MCNC: 0.3 MG/DL (ref 0.2–1.3)
BUN SERPL-MCNC: 13 MG/DL (ref 7–30)
CALCIUM SERPL-MCNC: 9.2 MG/DL (ref 8.5–10.1)
CD3 CELLS # BLD: 2661 CELLS/UL (ref 603–2990)
CD3 CELLS NFR BLD: 60 % (ref 49–84)
CD3+CD4+ CELLS # BLD: 943 CELLS/UL (ref 441–2156)
CD3+CD4+ CELLS NFR BLD: 21 % (ref 28–63)
CD3+CD4+ CELLS/CD3+CD8+ CLL BLD: 0.55 % (ref 1.4–2.6)
CD3+CD8+ CELLS # BLD: 1681 CELLS/UL (ref 125–1312)
CD3+CD8+ CELLS NFR BLD: 38 % (ref 10–40)
CHLORIDE SERPL-SCNC: 106 MMOL/L (ref 94–109)
CHOLEST SERPL-MCNC: 174 MG/DL
CO2 SERPL-SCNC: 27 MMOL/L (ref 20–32)
CREAT SERPL-MCNC: 0.93 MG/DL (ref 0.66–1.25)
DIFFERENTIAL METHOD BLD: NORMAL
EOSINOPHIL # BLD AUTO: 0.2 10E9/L (ref 0–0.7)
EOSINOPHIL NFR BLD AUTO: 1.7 %
ERYTHROCYTE [DISTWIDTH] IN BLOOD BY AUTOMATED COUNT: 12.6 % (ref 10–15)
GFR SERPL CREATININE-BSD FRML MDRD: >90 ML/MIN/{1.73_M2}
GLUCOSE SERPL-MCNC: 98 MG/DL (ref 70–99)
HCT VFR BLD AUTO: 46.3 % (ref 40–53)
HDLC SERPL-MCNC: 36 MG/DL
HGB BLD-MCNC: 15.3 G/DL (ref 13.3–17.7)
IFC SPECIMEN: ABNORMAL
IMM GRANULOCYTES # BLD: 0 10E9/L (ref 0–0.4)
IMM GRANULOCYTES NFR BLD: 0.2 %
LDLC SERPL CALC-MCNC: 88 MG/DL
LIPASE SERPL-CCNC: 117 U/L (ref 73–393)
LYMPHOCYTES # BLD AUTO: 4.1 10E9/L (ref 0.8–5.3)
LYMPHOCYTES NFR BLD AUTO: 45.8 %
MCH RBC QN AUTO: 31.6 PG (ref 26.5–33)
MCHC RBC AUTO-ENTMCNC: 33 G/DL (ref 31.5–36.5)
MCV RBC AUTO: 96 FL (ref 78–100)
MONOCYTES # BLD AUTO: 0.5 10E9/L (ref 0–1.3)
MONOCYTES NFR BLD AUTO: 5 %
NEUTROPHILS # BLD AUTO: 4.2 10E9/L (ref 1.6–8.3)
NEUTROPHILS NFR BLD AUTO: 46.9 %
NONHDLC SERPL-MCNC: 137 MG/DL
NRBC # BLD AUTO: 0 10*3/UL
NRBC BLD AUTO-RTO: 0 /100
PLATELET # BLD AUTO: 226 10E9/L (ref 150–450)
POTASSIUM SERPL-SCNC: 3.8 MMOL/L (ref 3.4–5.3)
PROT SERPL-MCNC: 8.1 G/DL (ref 6.8–8.8)
RBC # BLD AUTO: 4.84 10E12/L (ref 4.4–5.9)
SODIUM SERPL-SCNC: 138 MMOL/L (ref 133–144)
T PALLIDUM AB SER QL: NONREACTIVE
TRIGL SERPL-MCNC: 247 MG/DL
WBC # BLD AUTO: 9 10E9/L (ref 4–11)

## 2020-10-12 PROCEDURE — 80061 LIPID PANEL: CPT | Performed by: PATHOLOGY

## 2020-10-12 PROCEDURE — 83690 ASSAY OF LIPASE: CPT | Performed by: PATHOLOGY

## 2020-10-12 PROCEDURE — 82150 ASSAY OF AMYLASE: CPT | Performed by: PATHOLOGY

## 2020-10-12 PROCEDURE — 85025 COMPLETE CBC W/AUTO DIFF WBC: CPT | Performed by: PATHOLOGY

## 2020-10-12 PROCEDURE — 86359 T CELLS TOTAL COUNT: CPT | Mod: 90 | Performed by: PATHOLOGY

## 2020-10-12 PROCEDURE — 86780 TREPONEMA PALLIDUM: CPT | Mod: 90 | Performed by: PATHOLOGY

## 2020-10-12 PROCEDURE — 99000 SPECIMEN HANDLING OFFICE-LAB: CPT | Performed by: PATHOLOGY

## 2020-10-12 PROCEDURE — 87536 HIV-1 QUANT&REVRSE TRNSCRPJ: CPT | Mod: 90 | Performed by: PATHOLOGY

## 2020-10-12 PROCEDURE — 80053 COMPREHEN METABOLIC PANEL: CPT | Performed by: PATHOLOGY

## 2020-10-12 PROCEDURE — 86360 T CELL ABSOLUTE COUNT/RATIO: CPT | Mod: 90 | Performed by: PATHOLOGY

## 2020-10-12 RX ORDER — POLYMYXIN B SULFATE, BACITRACIN ZINC AND NEOMYCIN SULFATE 400; 3.5; 5 [USP'U]/G; MG/G; [USP'U]/G
1 OINTMENT TOPICAL
Qty: 30 TABLET | Refills: 0 | Status: SHIPPED | OUTPATIENT
Start: 2020-10-12 | End: 2020-10-28

## 2020-10-12 NOTE — PROGRESS NOTES
Per [facility] Protocol, Pt is due for routine labs based on disease state or monitoring of medications. Lab orders entered per clinic protocol.  Mya Aggarwla RN

## 2020-10-23 ENCOUNTER — TELEPHONE (OUTPATIENT)
Dept: PHARMACY | Facility: CLINIC | Age: 50
End: 2020-10-23

## 2020-10-23 NOTE — TELEPHONE ENCOUNTER
Called patient for refill reminder.    Will mail prescriptions address has been verified.     Last Filled on:  09/30/20   Follow-up Date: 11/24/20    Marlene Calzada CPhT  [facility]  [phone]

## 2020-10-28 ENCOUNTER — VIRTUAL VISIT (OUTPATIENT)
Dept: INFECTIOUS DISEASES | Facility: CLINIC | Age: 50
End: 2020-10-28
Attending: INTERNAL MEDICINE
Payer: COMMERCIAL

## 2020-10-28 DIAGNOSIS — Z21 HUMAN IMMUNODEFICIENCY VIRUS I INFECTION (H): Primary | ICD-10-CM

## 2020-10-28 PROCEDURE — 99214 OFFICE O/P EST MOD 30 MIN: CPT | Mod: 95 | Performed by: INTERNAL MEDICINE

## 2020-10-28 RX ORDER — POLYMYXIN B SULFATE, BACITRACIN ZINC AND NEOMYCIN SULFATE 400; 3.5; 5 [USP'U]/G; MG/G; [USP'U]/G
1 OINTMENT TOPICAL
Qty: 90 TABLET | Refills: 3 | Status: SHIPPED | OUTPATIENT
Start: 2020-10-28 | End: 2021-10-27

## 2020-10-28 ASSESSMENT — PAIN SCALES - GENERAL: PAINLEVEL: NO PAIN (0)

## 2020-10-28 NOTE — PROGRESS NOTES
"Viral Guerin is a 49 year old male who is being evaluated via a billable telephone visit.      The patient has been notified of following:     \"This telephone visit will be conducted via a call between you and your physician/provider. We have found that certain health care needs can be provided without the need for a physical exam.  This service lets us provide the care you need with a short phone conversation.  If a prescription is necessary we can send it directly to your pharmacy.  If lab work is needed we can place an order for that and you can then stop by our lab to have the test done at a later time.    Telephone visits are billed at different rates depending on your insurance coverage. During this emergency period, for some insurers they may be billed the same as an in-person visit.  Please reach out to your insurance provider with any questions.    If during the course of the call the physician/provider feels a telephone visit is not appropriate, you will not be charged for this service.\"    Patient has given verbal consent for Telephone visit?  Yes    What phone number would you like to be contacted at? [phone]    How would you like to obtain your AVS? Mail a copy    Phone call duration: 6 minutes (6:13 - 6:18 PM)      Division of Infectious Diseases and International Medicine  Department of Medicine    [facility] TELEPHONE VISIT NOTE [place] [address] [place] [address]  Office: [phone]  Fax:  [phone]     HISTORY OF PRESENT ILLNESS:  Viral Guerin is a charming 49 year old gentleman with asymptomatic HIV infection  (diagnosed 6/11/10) who was scheduled for an annual follow-up visit today in [facility] regarding his ongoing antiretroviral therapy with Odefsey one tablet PO daily with breakfast  (switched to Odefsey from Atripla ~ 6/25/18; Atripla started in 7/10) (started 4/10/19), but due to the Covid-19 pandemic restrictions opted instead " "for a Virtual Telephone Visit today.  He has not missed an Odefsey dose in a very long while and notices no medication side effects.  He says he is staying safe against letha Covid-19, is surviving the epidemic adequately, and feels generally as \"strong as a lion\" of late continues to take and tolerate that medication well.  He continues to exercise frequently by working out on a treadmill and doing push-ups daily.  He remains off cigarettes which he quit in early 2019.  He had routine laboratory studies drawn on 10/12/20 which look good and which we reviewed today.  Beyond that, he lacks any other health concerns or complaints now, including no recent febrile symptoms, EENT symptoms, cough, dyspnea, GI or  symptoms, or other problems.    PAST MEDICAL HISTORY:  Past Medical History:   Diagnosis Date     Childhood asthma     Resolved.     Finger fracture, left ~ 1991.     Human immunodeficiency virus (HIV) disease (H) Diagnosed 6/11/10    CD4+ cell count never below 200, no AIDS sequelae.  Started therapy with Atripla 7/2/10.     CURRENT MEDICATIONS:  Current Outpatient Medications   Medication Sig Dispense Refill     albuterol (PROAIR HFA) 108 (90 Base) MCG/ACT Inhaler Inhale 2 puffs into the lungs every 4 hours as needed for shortness of breath / dyspnea or wheezing 1 Inhaler 0     emtricitabine-rilpivirine-tenofovir (ODEFSEY) 200-25-25 MG TABS per tablet Take 1 tablet by mouth daily with food 90 tablet 3     Multiple Vitamin (ONE-A-DAY MENS) TABS Take  by mouth daily.       SOCIAL HISTORY:  Social History     Socioeconomic History     Marital status: Single     Spouse name: Not on file     Number of children: Not on file     Years of education: Not on file     Highest education level: Not on file   Occupational History     Not on file   Social Needs     Financial resource strain: Not on file     Food insecurity     Worry: Not on file     Inability: Not on file     Transportation needs     Medical: Not on " "file     Non-medical: Not on file   Tobacco Use     Smoking status: Current Every Day Smoker     Packs/day: 0.30     Types: Cigarettes     Smokeless tobacco: Never Used   Substance and Sexual Activity     Alcohol use: Yes     Comment: Wine daily.     Drug use: No     Sexual activity: Not on file   Lifestyle     Physical activity     Days per week: Not on file     Minutes per session: Not on file     Stress: Not on file   Relationships     Social connections     Talks on phone: Not on file     Gets together: Not on file     Attends [employer] service: Not on file     Active member of club or organization: Not on file     Attends meetings of clubs or organizations: Not on file     Relationship status: Not on file     Intimate partner violence     Fear of current or ex partner: Not on file     Emotionally abused: Not on file     Physically abused: Not on file     Forced sexual activity: Not on file   Other Topics Concern     Parent/sibling w/ CABG, MI or angioplasty before 65F 55M? Not Asked   Social History Narrative    Citizen of [place], moved to [place] in early 2010.  Lives in [place].  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   Sexually active with a female partner (insertive only) and practices safe sex with a condom.  He performs push-ups for exercise each morning and evening.  He now also goes to a gym twice a week and [facility] [employer].  Quit smoking ~ early 2016.    FAMILY HISTORY:  Family History   Problem Relation Age of Onset     Hypertension Other      Glaucoma Mother      REVIEW OF SYMPTOMS:  As per HPI.  Complete ROS is otherwise negative.    PHYSICAL EXAMINATION:  Physical Examination:  Physical Examination:  Reported vitals:  Respirations sound normal.  There were no other vitals taken for this telephone visit.   Most aspects of the Physical Exam were unobtainable in the context of this telephone visit, but the following was " ascertained:  GENERAL:  Pleasant, conversant, calm, upbeat, comfortable-sounding.  ENT:  No evident hearing deficit.  Normal tone of voice and volume.  RESP: No cough, no audible wheezing.  Able to talk in full sentences.  NEURO:  Alert, oriented x 3, memory / cognition sound normal.  PSYCH: Normal affect, coherent speech, able to articulate logical thoughts and reason, no tangential thoughts, no hallucinations.    LABORATORY STUDIES (Reviewed with the patient during his appointment today):    Orders Only on 10/12/2020   Component Date Value Ref Range Status     Lipase 10/12/2020 117  73 - 393 U/L Final     Amylase 10/12/2020 80  30 - 110 U/L Final     Cholesterol 10/12/2020 174  <200 mg/dL Final     Triglycerides 10/12/2020 247* <150 mg/dL Final    Comment: Borderline high:  150-199 mg/dl  High:             200-499 mg/dl  Very high:       >499 mg/dl       HDL Cholesterol 10/12/2020 36* >39 mg/dL Final     LDL Cholesterol Calculated 10/12/2020 88  <100 mg/dL Final    Desirable:       <100 mg/dl     Non HDL Cholesterol 10/12/2020 137* <130 mg/dL Final    Comment: Above Desirable:  130-159 mg/dl  Borderline high:  160-189 mg/dl  High:             190-219 mg/dl  Very high:       >219 mg/dl       Treponema Antibodies 10/12/2020 Nonreactive  NR^Nonreactive Final    Comment: Methodology Change: Test performed on the Shop[employer] Liaison XL by Treponema   pallidum Total Antibodies Assay as of 3.17.2020.       IFC Specimen 10/12/2020 Blood   Final     CD3 Mature T 10/12/2020 60  49 - 84 % Final     CD4 [place] T 10/12/2020 21* 28 - 63 % Final     CD8 Suppressor T 10/12/2020 38  10 - 40 % Final     CD4:CD8 Ratio 10/12/2020 0.55* 1.40 - 2.60 Final     Absolute CD3 10/12/2020 2,661  603 - 2,990 cells/uL Final     Absolute CD4 10/12/2020 943  441 - 2,156 cells/uL Final     Absolute CD8 10/12/2020 1,681* 125 - 1,312 cells/uL Final     HIV-1 RNA Quant Result 10/12/2020 HIV-1 RNA Not Detected  HIVND^HIV-1 RNA Not Detected [Copies]/mL  Final    Comment: The ERIKA AmpliPrep/ERIKA TaqMan HIV-1 test is an FDA-approved in vitro   nucleic acid amplification test for the quantitation of HIV-1 RNA in human   plasma (EDTA plasma) using the ERIKA AmpliPrep instrument for automated viral   nucleic acid extraction and the ERIKA TaqMan Analyzer or ERIKA TaqMan for   automated Real Time PCR amplification and detection of the viral nucleic acid   target.  Titer results are reported in copies/ml. This assay is intended for use in   conjunction with clinical presentation and other laboratory markers of disease   prognosis and for use as an aid in assessing viral response to antiretroviral   treatment as measured by changes in plasma HIV-1 RNA levels. This test should   not be used as a donor screening test to confirm the presence of HIV-1   infection.       HIV RNA QT Log 10/12/2020 Not Calculated  <1.3 [Log_copies]/mL Final     WBC 10/12/2020 9.0  4.0 - 11.0 10e9/L Final     RBC Count 10/12/2020 4.84  4.4 - 5.9 10e12/L Final     Hemoglobin 10/12/2020 15.3  13.3 - 17.7 g/dL Final     Hematocrit 10/12/2020 46.3  40.0 - 53.0 % Final     MCV 10/12/2020 96  78 - 100 fl Final     MCH 10/12/2020 31.6  26.5 - 33.0 pg Final     MCHC 10/12/2020 33.0  31.5 - 36.5 g/dL Final     RDW 10/12/2020 12.6  10.0 - 15.0 % Final     Platelet Count 10/12/2020 226  150 - 450 10e9/L Final     Diff Method 10/12/2020 Automated Method   Final     % Neutrophils 10/12/2020 46.9  % Final     % Lymphocytes 10/12/2020 45.8  % Final     % Monocytes 10/12/2020 5.0  % Final     % Eosinophils 10/12/2020 1.7  % Final     % Basophils 10/12/2020 0.4  % Final     % Immature Granulocytes 10/12/2020 0.2  % Final     Nucleated RBCs 10/12/2020 0  0 /100 Final     Absolute Neutrophil 10/12/2020 4.2  1.6 - 8.3 10e9/L Final     Absolute Lymphocytes 10/12/2020 4.1  0.8 - 5.3 10e9/L Final     Absolute Monocytes 10/12/2020 0.5  0.0 - 1.3 10e9/L Final     Absolute Eosinophils 10/12/2020 0.2  0.0 - 0.7 10e9/L  Final     Absolute Basophils 10/12/2020 0.0  0.0 - 0.2 10e9/L Final     Abs Immature Granulocytes 10/12/2020 0.0  0 - 0.4 10e9/L Final     Absolute Nucleated RBC 10/12/2020 0.0   Final     Sodium 10/12/2020 138  133 - 144 mmol/L Final     Potassium 10/12/2020 3.8  3.4 - 5.3 mmol/L Final     Chloride 10/12/2020 106  94 - 109 mmol/L Final     Carbon Dioxide 10/12/2020 27  20 - 32 mmol/L Final     Anion Gap 10/12/2020 5  3 - 14 mmol/L Final     Glucose 10/12/2020 98  70 - 99 mg/dL Final     Urea Nitrogen 10/12/2020 13  7 - 30 mg/dL Final     Creatinine 10/12/2020 0.93  0.66 - 1.25 mg/dL Final     GFR Estimate 10/12/2020 >90  >60 mL/min/[1.73_m2] Final    Comment: Non  GFR Calc  Starting 12/18/2018, serum creatinine based estimated GFR (eGFR) will be   calculated using the Chronic Kidney Disease Epidemiology Collaboration   (CKD-EPI) equation.       GFR Estimate If Black 10/12/2020 >90  >60 mL/min/[1.73_m2] Final    Comment:  GFR Calc  Starting 12/18/2018, serum creatinine based estimated GFR (eGFR) will be   calculated using the Chronic Kidney Disease Epidemiology Collaboration   (CKD-EPI) equation.       Calcium 10/12/2020 9.2  8.5 - 10.1 mg/dL Final     Bilirubin Total 10/12/2020 0.3  0.2 - 1.3 mg/dL Final     Albumin 10/12/2020 4.1  3.4 - 5.0 g/dL Final     Protein Total 10/12/2020 8.1  6.8 - 8.8 g/dL Final     Alkaline Phosphatase 10/12/2020 58  40 - 150 U/L Final     ALT 10/12/2020 39  0 - 70 U/L Final     AST 10/12/2020 24  0 - 45 U/L Final     IMPRESSION AND PLAN:  1. Asymptomatic HIV infection:  On Odefsey since ~ late 6/18 he continues to have a normal absolute CD4+ cell count and undetectable HIV plasma viral load with very tight medication dosing adherence and good tolerance.   He will continue taking Odefsey and return to [facility] for follow-up in one year (with labs then), or as needed before then.  2. Now long-resolved, prior small internal rectal hemorrhoid  in 9/19 with intermittent BPBPR:  Resolved.  3. Obesity:  Not measured or commented on today in this Telephone Visit, but I applauded and encouraged his regular aerobic exercise.  He was referred to a Nutritionist in 9/19 although apparently never actually had an appointment.  Will address again at next appointment.  4. History of past recurrent urinary Chlamydia infections -- no recent symptoms:  In the past, he has been treated with azithromycin / IM ceftriaxone here on 6/22/15 and 10/3/17 (with a positive 9/20/17 urine Chlamydia screen).  He had some mild ureteral discharge symptoms and a possible exposure in early 4/19, so was again presumptively treated with azithromycin / ceftriaxone once again then, but screens since 4/10/19 have been negativel.  He has never tested positive for gonorrhea or syphilis here and has denied potential exposures in recent years.  5. Successful tobacco cessation:  Quit in ~ 3/19.  6. Health Care Maintenance:   Influenza vaccine given 10/20/20.  Menactra vaccine given 11/25/15.  Received Tdap 2/12/14.  Received a Prevnar 13 vaccine 2/12/14 and Pneumovax on 8/13/14.  HAV / HBV sero-immune on 6/18/15 post-vaccination screening.  HCV seronegative 6/28/11 and 5/16/16.  3/21/12 Quantiferon Gold assay negative.  Routine antitreponemal antibody screen negative most recently on 10/12/20.  Non-fasting lipids were good on 10/12/20 (total cholesterol 174, LDL 88, HDL 36) although the post-prandial noon-hour triglycerides assay was newly mildly elevated at 247.  Creatinine and blood pressure remain normal.  Urinalysis was benign on 7/23/19.  Underwent a right lower molar dental extraction in ~ 4/15; up to date with a dentist.

## 2020-11-25 ENCOUNTER — TELEPHONE (OUTPATIENT)
Dept: PHARMACY | Facility: CLINIC | Age: 50
End: 2020-11-25

## 2020-11-25 NOTE — TELEPHONE ENCOUNTER
Called patient for refill reminder.    Will mail 90 ds [facility] prescriptions address has been verified.     Last Filled on: 10/26/20   Follow-up Date: 02/20/21 90 ds    Marlene Calzada CPhT  [facility]  [phone]

## 2021-02-19 ENCOUNTER — TELEPHONE (OUTPATIENT)
Dept: PHARMACY | Facility: CLINIC | Age: 51
End: 2021-02-19

## 2021-02-19 NOTE — TELEPHONE ENCOUNTER
Called patient for refill reminder.    Will mail 90 ds [facility] prescriptions address has been verified.     Last Filled on: 11/25/21  90 ds  Follow-up Date: 05/14/21    Marlene Calzada CPhT  [facility]  [phone]

## 2021-03-19 ENCOUNTER — TELEPHONE (OUTPATIENT)
Dept: NEUROSURGERY | Facility: CLINIC | Age: 51
End: 2021-03-19

## 2021-03-19 NOTE — TELEPHONE ENCOUNTER
[facility] Call Center    Phone Message    May a detailed message be left on voicemail: yes     Reason for Call: Other: Pt calling to schedule with neurosurgery.  Pt was seen at [facility] on 3/12 for Cervical radiculopathy.  Per discharge notes in chart, pt is to follow up with neurosurgery.  Per protocols, writer unable to schedule (missing provider and format).  Please call to schedule at earliest convenience.    Action Taken: Message routed to:  [facility] ([facility] NEUROSURGERY    Travel Screening: Not Applicable

## 2021-03-19 NOTE — TELEPHONE ENCOUNTER
I spoke to the patient clinic consult scheduled with Karina on 03/23/21 at 4 pm. Patient was given clinic address and our floor number. Patient had no further questions or concerns.

## 2021-03-19 NOTE — TELEPHONE ENCOUNTER
Routed to Neurosurgery Clinic Scheduling team for ED follow up with MACIEL Collins.     Anabelle Canales LPN  Neurosurgery

## 2021-03-22 NOTE — TELEPHONE ENCOUNTER
SPINE PATIENTS - NEW PROTOCOL PREVISIT    RECORDS RECEIVED FROM: Internal   Date of Appt: 3/23/21   NOTES (FOR ALL VISITS) STATUS DETAILS   OFFICE NOTE from referring provider Care Everywhere SEE INPATIENT NOTES   OFFICE NOTE from other specialist N/A    DISCHARGE SUMMARY from hospital N/A    DISCHARGE REPORT from ER Care Everywhere [place]:  3/12/21   EMG REPORT N/A    MEDICATION LIST Care Everywhere    IMAGING  (FOR ALL VISITS)     MRI (HEAD, NECK, SPINE) Received  [facility]:  MRI Cervical Spine 3/12/21   XRAY (SPINE) *NEUROSURGERY* N/A    CT (HEAD, NECK, SPINE) N/A       Action 3/22/21 MV 8.13am   Action Taken Imaging request faxed to  for:  MRI C Spine 3/12/21     Action 3/22/21 MV 4.29pm   Action Taken Imaging received from  and resolved in PACS

## 2021-03-23 ENCOUNTER — PRE VISIT (OUTPATIENT)
Dept: NEUROSURGERY | Facility: CLINIC | Age: 51
End: 2021-03-23

## 2021-03-23 ENCOUNTER — VIRTUAL VISIT (OUTPATIENT)
Dept: NEUROSURGERY | Facility: CLINIC | Age: 51
End: 2021-03-23
Payer: COMMERCIAL

## 2021-03-23 ENCOUNTER — TELEPHONE (OUTPATIENT)
Dept: NEUROSURGERY | Facility: CLINIC | Age: 51
End: 2021-03-23

## 2021-03-23 DIAGNOSIS — M48.02 NEUROFORAMINAL STENOSIS OF CERVICAL SPINE: Primary | ICD-10-CM

## 2021-03-23 DIAGNOSIS — M54.2 ACUTE NECK PAIN: ICD-10-CM

## 2021-03-23 PROCEDURE — 99203 OFFICE O/P NEW LOW 30 MIN: CPT | Mod: 95 | Performed by: NURSE PRACTITIONER

## 2021-03-23 RX ORDER — GABAPENTIN 300 MG/1
CAPSULE ORAL
Qty: 90 CAPSULE | Refills: 1 | Status: SHIPPED | OUTPATIENT
Start: 2021-03-23 | End: 2021-05-27

## 2021-03-23 NOTE — PROGRESS NOTES
Viral is a 50 year old who is being evaluated via a billable video visit.      This is a video/telephone visit conducted due to [facility] restrictions on non-urgent clinic visits due to risk of the spread of COVID-19 pandemic virus. The patient did not identify any urgent or red-flag symptoms that would require in-person evaluation.      How would you like to obtain your AVS? mail  If the video visit is dropped, the invitation should be resent by: [phone]  Will anyone else be joining your video visit? no      Video Start Time:   2:05 pm     Type of service:  Video Visit    Video End Time:    2:24 pm     Originating Location (pt. Location):    Home     Distant Location (provider location):  [facility] [place]     Platform used for Video Visit: [employer]      Reason for visit:    Acute neck pain radiating to Left Shoulder     History of present illness:  Viral Guerin is a 50 year old gentleman with past medical history of HIV, who is seen following ER Visit for acute onset left-sided neck pain, and radiating pain to left shoulder.   He has Pain in neck radiates down to left shoulder blade and to left elbow.   Excruciating pain, constant   No pain or symptoms past the elbow.   No numbness or tingling in hands/fingers.   No difficulty w/balance, or gait  No bowel or bladder concerns or complaints   Ambulating independently without assistive device   Right arm is asymptomatic   He has finished a course of oral steroid and   And out of Oxycodone pain medication   Continues on Ibuprofen which is not effective.   No arm weakness      Review of systems: 10 point ROS negative except for as detailed in HPI  Past Medical History:   Past Medical History:   Diagnosis Date     Childhood asthma     Resolved.     Finger fracture, left ~ 1991.     Human immunodeficiency virus (HIV) disease (H) Diagnosed 6/11/10    CD4+ cell count never below 200, no AIDS sequelae.   Started therapy with Atripla 7/2/10.     Surgical History:   No prior surgical procedures     Medications:  Current Outpatient Medications   Medication     albuterol (PROAIR HFA) 108 (90 Base) MCG/ACT Inhaler     emtricitabine-rilpivirine-tenofovir (ODEFSEY) 200-25-25 MG TABS per tablet     Multiple Vitamin (ONE-A-DAY MENS) TABS     No current facility-administered medications for this visit.        Social History     Tobacco Use     Smoking status: Quit smoking 3 mos ago  (updated March 2021)     Packs/day: 0.30     Types: Cigarettes     Smokeless tobacco: Never Used   Substance Use Topics     Alcohol use: Yes     Comment: Wine daily.     Drug use: No       Family History   Problem Relation Age of Onset     Hypertension Other      Glaucoma Mother        Physical exam:   There were no vitals taken for this visit.    General    Alert, cooperative.  No acute distress  Pulmonary:   Breathing comfortably on room air. No cough, or shortness of breath  Skin:    Visible skin without lesions or obvious rash  Speech is fluent  Maintains eye contact  Musculoskeletal:    Moving upper extremities freely with good strength      Imaging:  [facility]  MR CERVICAL SPINE WO CONTRAST  3/12/2021 11:32 AM      FINDINGS: Exam is mild to moderately degraded by motion artifact.    Lateral alignment is normal. Normal cervical lordosis. Craniocervical junction is intact. Mild degenerative change anterior C1-C2 articulation. Allowing for lower cervical endplate degenerative changes, vertebral body heights are grossly preserved. No   marrow edema.    Dorsal paraspinal soft tissues are unremarkable. No prevertebral soft tissue swelling. Cervical vertebral artery flow voids are preserved.    Visualized intracranial contents are unremarkable. Cerebellar tonsils are normally positioned. Patient has a relatively slender cervical canal on a congenital/developmental basis. Although assessment is degraded by motion artifact,  there appears to be at   least mild AP flattening of the cord at C7-T1 and to a lesser extent C6-7 secondary to posterior disc bulging. Although the cord is suboptimally evaluated on the basis of this exam given the degree of motion artifact, no convincing signal abnormality is   seen within the cord to suggest cord edema or myelomalacic change.    C2-C3: Normal disc height. No herniation. Mild right-sided facet arthropathy. No spinal canal stenosis. Mild right foraminal stenosis. No left foraminal stenosis.    C3-C4: Normal disc height. Minor facet arthropathy. No spinal canal stenosis. Moderate to severe right foraminal stenosis. Moderate left foraminal stenosis.    C4-C5: Normal disc height. No disc herniation. No facet arthropathy. No spinal canal stenosis. Mild to moderate right foraminal stenosis. Moderate left foraminal stenosis.    C5-C6: Normal disc height. No disc herniation. No facet arthropathy. No spinal canal stenosis. Moderate right foraminal stenosis. Mild left foraminal stenosis.    C6/C7: Normal disc height. Mild posterior interbody spurring and broad-based posterior disc bulge eccentric to the right. This appears to contact and flatten the ventral margin of the cord. No facet arthropathy. At least mild spinal canal stenosis.   Moderate right foraminal stenosis. Moderate to severe left foraminal stenosis.    C7-T1: Normal disc height. Broad-based posterior disc bulge with moderate-sized right posterior lateral disc protrusion (axial image 24). There is ventral abutment of the cord and apparent cord flattening. Moderate spinal canal stenosis. Severe right   inlet and foraminal stenosis. Severe left foraminal stenosis.    Visualized upper thoracic levels (T1-2 through T3-4) are without significant disc herniation or canal or foraminal stenosis.    IMPRESSION:   CONCLUSION:  1.  Examination is degraded by motion artifact.    2.  Posterior disc herniation C6-7 and C7-T1 contact and flatten the ventral  margin of the cord without definite signal abnormality within the cord. There is mild C6-7 and moderate C7-T1 canal stenosis. Severe bilateral C7-T1 foraminal narrowing with   moderate right and moderate to severe left C6-7 foraminal stenosis.    3.  Moderate right and mild left foraminal narrowing C5-6. Mild to moderate right and moderate left foraminal stenosis C4-5. Moderate to severe right and moderate left foraminal stenosis C3-4. Mild right foraminal stenosis C2-3.      Assessment:  ~Acute neck pain  ~Left arm cervical radicular pain       Plan:  ~Referral to [facility] for epidural steroid injection of cervical spine   ~Trial of Gabapentin titrate to 300 mg three times a day (Sent to [facility])  ~Will follow up following Cervical spine injection  ~Will likely refer to physical therapy following injection when pain/inflammation has settled down    At the end of the visit, all the patient's questions and concerns had been addressed and the patient was agreeable with the plan of care as outlined above. The patient has our office contact information at [phone], and knows to call with any questions, concerns, or changes in condition.        Deedee Collins DNP  Neurosurgery Nurse Practitioner  [facility]  [phone]

## 2021-03-23 NOTE — PATIENT INSTRUCTIONS
~Referral to [facility] for epidural steroid injection of cervical spine   ~Trial of Gabapentin titrate to 300 mg three times a day (Sent to [facility])  ~Will follow up following Cervical spine injection  ~Will likely refer to physical therapy following injection when pain/inflammation has settled down    At the end of the visit, all the patient's questions and concerns had been addressed and the patient was agreeable with the plan of care as outlined above. The patient has our office contact information at [phone], and knows to call with any questions, concerns, or changes in condition.

## 2021-03-23 NOTE — LETTER
3/23/2021       RE: Viral Guerin  [address]  [place] [address]     Dear Colleague,    Thank you for referring your patient, Viral Guerin, to the [facility]UROSURGERY [facility] [place] [facility]. Please see a copy of my visit note below.    Viral is a 50 year old who is being evaluated via a billable video visit.      This is a video/telephone visit conducted due to [facility] on non-urgent clinic visits due to risk of the spread of COVID-19 pandemic virus. The patient did not identify any urgent or red-flag symptoms that would require in-person evaluation.      How would you like to obtain your AVS? mail  If the video visit is dropped, the invitation should be resent by: [phone]  Will anyone else be joining your video visit? no      Video Start Time:   2:05 pm     Type of service:  Video Visit    Video End Time:    2:24 pm     Originating Location (pt. Location):    Home     Distant Location (provider location):  [facility]UROSUR[facility]     Platform used for Video Visit: doximity      Reason for visit:    Acute neck pain radiating to Left Shoulder     History of present illness:  Viral Guerin is a 50 year old gentleman with past medical history of HIV, who is seen following ER Visit for acute onset left-sided neck pain, and radiating pain to left shoulder.   He has Pain in neck radiates down to left shoulder blade and to left elbow.   Excruciating pain, constant   No pain or symptoms past the elbow.   No numbness or tingling in hands/fingers.   No difficulty w/balance, or gait  No bowel or bladder concerns or complaints   Ambulating independently without assistive device   Right arm is asymptomatic   He has finished a course of oral steroid and   And out of Oxycodone pain medication   Continues on Ibuprofen which is not effective.   No arm weakness      Review of  systems: 10 point ROS negative except for as detailed in HPI  Past Medical History:   Past Medical History:   Diagnosis Date     Childhood asthma     Resolved.     Finger fracture, left ~ 1991.     Human immunodeficiency virus (HIV) disease (H) Diagnosed 6/11/10    CD4+ cell count never below 200, no AIDS sequelae.  Started therapy with Atripla 7/2/10.     Surgical History:   No prior surgical procedures     Medications:  Current Outpatient Medications   Medication     albuterol (PROAIR HFA) 108 (90 Base) MCG/ACT Inhaler     emtricitabine-rilpivirine-tenofovir (ODEFSEY) 200-25-25 MG TABS per tablet     Multiple Vitamin (ONE-A-DAY MENS) TABS     No current facility-administered medications for this visit.        Social History     Tobacco Use     Smoking status: Quit smoking 3 mos ago  (updated March 2021)     Packs/day: 0.30     Types: Cigarettes     Smokeless tobacco: Never Used   Substance Use Topics     Alcohol use: Yes     Comment: Wine daily.     Drug use: No       Family History   Problem Relation Age of Onset     Hypertension Other      Glaucoma Mother        Physical exam:   There were no vitals taken for this visit.    General    Alert, cooperative.  No acute distress  Pulmonary:   Breathing comfortably on room air. No cough, or shortness of breath  Skin:    Visible skin without lesions or obvious rash  Speech is fluent  Maintains eye contact  Musculoskeletal:    Moving upper extremities freely with good strength      Imaging:  [facility]  MR CERVICAL SPINE WO CONTRAST  3/12/2021 11:32 AM      FINDINGS: Exam is mild to moderately degraded by motion artifact.    Lateral alignment is normal. Normal cervical lordosis. Craniocervical junction is intact. Mild degenerative change anterior C1-C2 articulation. Allowing for lower cervical endplate degenerative changes, vertebral body heights are grossly preserved. No   marrow edema.    Dorsal paraspinal soft tissues are unremarkable. No  prevertebral soft tissue swelling. Cervical vertebral artery flow voids are preserved.    Visualized intracranial contents are unremarkable. Cerebellar tonsils are normally positioned. Patient has a relatively slender cervical canal on a congenital/developmental basis. Although assessment is degraded by motion artifact, there appears to be at   least mild AP flattening of the cord at C7-T1 and to a lesser extent C6-7 secondary to posterior disc bulging. Although the cord is suboptimally evaluated on the basis of this exam given the degree of motion artifact, no convincing signal abnormality is   seen within the cord to suggest cord edema or myelomalacic change.    C2-C3: Normal disc height. No herniation. Mild right-sided facet arthropathy. No spinal canal stenosis. Mild right foraminal stenosis. No left foraminal stenosis.    C3-C4: Normal disc height. Minor facet arthropathy. No spinal canal stenosis. Moderate to severe right foraminal stenosis. Moderate left foraminal stenosis.    C4-C5: Normal disc height. No disc herniation. No facet arthropathy. No spinal canal stenosis. Mild to moderate right foraminal stenosis. Moderate left foraminal stenosis.    C5-C6: Normal disc height. No disc herniation. No facet arthropathy. No spinal canal stenosis. Moderate right foraminal stenosis. Mild left foraminal stenosis.    C6/C7: Normal disc height. Mild posterior interbody spurring and broad-based posterior disc bulge eccentric to the right. This appears to contact and flatten the ventral margin of the cord. No facet arthropathy. At least mild spinal canal stenosis.   Moderate right foraminal stenosis. Moderate to severe left foraminal stenosis.    C7-T1: Normal disc height. Broad-based posterior disc bulge with moderate-sized right posterior lateral disc protrusion (axial image 24). There is ventral abutment of the cord and apparent cord flattening. Moderate spinal canal stenosis. Severe right   inlet and foraminal  stenosis. Severe left foraminal stenosis.    Visualized upper thoracic levels (T1-2 through T3-4) are without significant disc herniation or canal or foraminal stenosis.    IMPRESSION:   CONCLUSION:  1.  Examination is degraded by motion artifact.    2.  Posterior disc herniation C6-7 and C7-T1 contact and flatten the ventral margin of the cord without definite signal abnormality within the cord. There is mild C6-7 and moderate C7-T1 canal stenosis. Severe bilateral C7-T1 foraminal narrowing with   moderate right and moderate to severe left C6-7 foraminal stenosis.    3.  Moderate right and mild left foraminal narrowing C5-6. Mild to moderate right and moderate left foraminal stenosis C4-5. Moderate to severe right and moderate left foraminal stenosis C3-4. Mild right foraminal stenosis C2-3.    Assessment:  ~Acute neck pain  ~Left arm cervical radicular pain     Plan:  ~Referral to [facility] for epidural steroid injection of cervical spine   ~Trial of Gabapentin titrate to 300 mg three times a day (Sent to [facility])  ~Will follow up following Cervical spine injection  ~Will likely refer to physical therapy following injection when pain/inflammation has settled down    At the end of the visit, all the patient's questions and concerns had been addressed and the patient was agreeable with the plan of care as outlined above. The patient has our office contact information at [phone], and knows to call with any questions, concerns, or changes in condition.     Deedee Collins DNP  Neurosurgery Nurse Practitioner  [facility]  [phone]

## 2021-03-29 ENCOUNTER — TELEPHONE (OUTPATIENT)
Dept: NEUROSURGERY | Facility: CLINIC | Age: 51
End: 2021-03-29

## 2021-03-31 ENCOUNTER — VIRTUAL VISIT (OUTPATIENT)
Dept: ANESTHESIOLOGY | Facility: CLINIC | Age: 51
End: 2021-03-31
Payer: COMMERCIAL

## 2021-03-31 DIAGNOSIS — Z11.59 ENCOUNTER FOR SCREENING FOR OTHER VIRAL DISEASES: ICD-10-CM

## 2021-03-31 DIAGNOSIS — M48.02 NEUROFORAMINAL STENOSIS OF CERVICAL SPINE: ICD-10-CM

## 2021-03-31 DIAGNOSIS — M54.12 CERVICAL RADICULOPATHY AT C6: Primary | ICD-10-CM

## 2021-03-31 PROCEDURE — 99203 OFFICE O/P NEW LOW 30 MIN: CPT | Mod: 95 | Performed by: ANESTHESIOLOGY

## 2021-03-31 ASSESSMENT — PAIN SCALES - GENERAL: PAINLEVEL: WORST PAIN (10)

## 2021-03-31 NOTE — PATIENT INSTRUCTIONS
Medications:    Recommend that you Increase the dose of Gabapentin gradually to 600 mg 3 times daily (Titrate as directed below)     Start with 300 mg in the Morning, 300 mg in the afternoon and  600 mg at bedtime. Take for 1-3 days, increase to next line as tolerated.       Increase to 300 mg in the Morning, 600 mg in the afternoon and  600 mg at bedtime. Take for 1-3 days, increase to next line as tolerated.     Increase to 600 mg in the Morning, 600 mg in the afternoon and  600 mg at bedtime. Continue at this dose.     Call the clinic with concerns or questions, or if you feel like you are needing to increase the medication further.     (Contact the clinic, as we can provide you with instructions to increase to 900 mg, three times daily- if indicated.)     If you are experiencing moderate to severe pain, take 400mg of ibuprofen and 1000mg of acetaminophen at the same time.   You may take this combination every six hours as needed.    The maximum daily dose of ibuprofen is 3200 mg. The maximum daily dose of acetaminophen  is 3000 mg.     Please provide the clinic with a minium of 1 week notice, on all prescription refills.     Referrals:    Physical Therapy Referral placed- (to be done after the injection)   If you have not heard from the scheduling office within 2 business days, please call [phone] for all locations    Procedures:    Call to schedule your procedure: [phone], option #2    Interlaminar cervical epidural steroid injection    Your pre-procedure instructions are below, please call our clinic if you have any questions.      Recommended Follow up:      2 weeks after the procedure with Dr. Schwab.        Please call [phone], option #1 to schedule your clinic appointment if you don't already have an appointment scheduled.      Procedure Information related to COVID-19    Please call [phone] option #2 to schedule, reschedule, or cancel your procedure appointment.   Phones are answered  Monday - Friday from 08:00 - 4:30pm.  Leave a voicemail with your name, birth date, and phone number if no one is available to take your call.     You will need to be tested for COVID-19 within 4 days (96 hours) of your procedure.  You will be called to schedule your COVID test by a central scheduling team. If you have not been contacted to schedule your test within 4 days of the scheduled procedure, call [phone]    Please be aware that the turn around time for the test is approximately 24-72 hours.   If your results are still pending the day of your procedure, you will be notified as soon as possible as the procedure may be cancelled.    Please note: You will only be contacted for positive and pending results.       At this time there are NO VISITORS allowed.  The procedure center staff will call you several days before the procedure to review important information that you will need to know for the day of the procedure.   Please contact the clinic if you have further questions about this information.       Information related to Scheduling and Pre-Procedure Instructions:    Please Hold Ibuprofen for 3 days before your procedure.      After calling to schedule your procedure appointment, please contact the clinic to make your follow up clinic appointment 2 weeks after the procedure.  Clinic phone- [phone], option #1      If you are having a Diagnostic procedure, you will be given a Pain Diary to document your pain relief.   Please fax the completed form to our office.   fax number is [phone]    If you must reschedule your procedure more than two times, you must follow up in clinic before rescheduling again.        Preparing for your procedure    CAUTION - FAILURE TO FOLLOW THESE PRE-PROCEDURE INSTRUCTIONS WILL RESULT IN YOUR PROCEDURE BEING RESCHEDULED.      Your procedure: Cervical Epidural Steroid Injection.             You must have a  take you home after your procedure. Transportation  by taxi or para-transit is okay as long as you have a responsible adult accompany you. You must provide your 's full name and contact number at time of check in.     Fasting Protocol Please have nothing to eat and drink for 2 hours before the procedure.        Medications If you take any medications, DO NOT STOP. Take your medications as usual the day of your procedure with a sip of water AT LEAST 2 HOURS PRIOR TO ARRIVAL.    Antibiotics If you are currently taking antibiotics, you must complete the entire dose 7 days prior to your scheduled procedure. You must be clear of any signs or symptoms of infection. If you begin antibiotics, please contact our clinic for instructions.     Fever, Chills, or Rash If you experience a fever of higher than 100 degrees, chills, rash, or open wounds during the one week before your procedure, please call the clinic to see if you may proceed with your procedure.      Medication Hold List  **Patients under Cardiology/Neurology care should consult their provider prior to the pain procedure to verify pre-procedure medication instructions. The information below contains general guidelines.**    Please Hold Ibuprofen for 3 days before your procedure.    Blood Thinners If you are taking daily ASPIRIN, PLAVIX, OR OTHER BLOOD THINNERS SUCH AS COUMADIN/WARFARIN, we will need your prescribing doctor to sign a release permitting you to stop these medications. Once approved by your prescribing doctor - STOP ALL BLOOD THINNERS BASED ON THE TIME TABLE BELOW PRIOR TO YOUR PROCEDURE. If you have been instructed to stop WARFARIN(COUMADIN), you must have an INR lab drawn the day before your procedure. . Your INR must be within normal limits before we can perform your injection. MEDICATIONS CAN BE RESTARTED AFTER YOUR PROCEDURE.    14 DAY HOLD  Ticlid (ticlopidine)    10 DAY HOLD  Effient (Prasugel)    3 DAY HOLD  Xarelto (rivaroxaban) 7 DAY HOLD  Anacin, Bufferin, Ecotrin, Excedrin, Aggrenox  (Aspirin)  Brilinta (ticagrelor)  Coumadin (Warfarin)  Pradexa (Dabigatran)  Elmiron (Pentosan)  Plavix (Clopidogrel Bisulfate)  Pletal (Cilostazol)    24 HOUR HOLD  Lovenox (enoxaparin)  Agrylin (Anagrelide)        Non-steroidal Anti-inflammatories (NSAIDs) DO NOT TAKE any non-steroidal anti-inflammatory medications (NSAIDs) listed on the table below. MEDICATIONS CAN BE RESTARTED AFTER YOUR PROCEDURE. Celebrex is OK to take and does not need to be discontinued.     Medications to stop:  3 DAY HOLD  Advil, Motrin (Ibuprofen)  Arthrotec (diciofenac sodium/misoprostol)  Clinoril (Sulindac)  Indocin (Indomethacin)  Lodine (Etodolac)  Toradol (Ketorolac)  Vicoprofen (Hydrocodone and Ibuprofen)  Voltaren (Diclotenac)    14 DAY HOLD  Daypro (Oxaprozin)  Feldene (Piroxicam)   7 DAY HOLD  Aleve (Naproxen sodium)  Darvon compound (contains aspirin)  Naprosyn (Naproxen)  Norgesic Forte (contains aspirin)  Mobic (Meloxicam)  Oruvall (Ketoprofen)  Percodan (contains aspirin)  Relafen (Nabumetone)  Salsalate  Trilisate  Vitamin E (more than 400 mg per day)  Any medication containing aspirin                To speak with a nurse, schedule/reschedule/cancel a clinic appointment, or request a medication refill call: [phone]     You can also reach us by [employer]: [email or web address]

## 2021-03-31 NOTE — PROGRESS NOTES
LPN read through the instructions with pt for the recommended procedure:   Interlaminar cervical epidural steroid injection  Pt verbalized understanding to holding appropriate medication per protocol, and was agreeable to NPO policy and needing a .    Anticoagulant: None, Pt advised to hold Ibuprofen for 3 days.     Recommended follow up: 2 weeks with Dr. Schwab.     AVS mailed to the pt.     Allyssa Shoemaker LPN

## 2021-03-31 NOTE — PROGRESS NOTES
Video call duration: 20 minutes    Assessment and plan    Patient is a 50-year-old male with past medical history of HIV, smoking, chronic neck pain.  Patient states that he developed acute neck pain approximately 3 weeks ago.  He denies any numbness and tingling in his hands or fingers.  His pain is constant and occasionally radiates to left shoulder and left elbow. He finished a course of oral steroid and has been taking oxycodone for pain. He tried ibuprofen which was not effective.  He was started on  gabapentin 300 mg  3 times daily, which he did not find to be effective.  Oxycodone makes her sleepy takes his pain away but it does not last very long.  He ran out of the oxycodone.  He went to see neurosurgery, who recommended epidural steroid injection. His MRI of cervical spine performed on 3/12/2021 reviewed.  Patient has moderate right neuroforaminal stenosis and moderate to severe left neuroforaminal stenosis at the C6-C7.  At C7-T1, patient is a broad-based posterior disc bulge with moderate-sized posterior lateral disc protrusion.  There is moderate spinal canal stenosis at this level severe right and left foraminal stenosis and severe left foraminal stenosis.      Exam  There is no vitals taken for this visit considering it is a video visit.  General patient is alert and cooperative.  No acute distress.  Pulmonary: Breathing comfortably.  No shortness of breath noted  Skin: No visible skin lesions or rashes.  Psych: Maintains eye contact  Musculoskeletal: Moving his upper extremity actively.  Freely with good strength.    Diagnosis    Cervical radiculopathy at C6    Cervical disc herniation    Cervical neuroforaminal stenosis    HIV      Plan     Increase the dose gradually to 600 mg 3 times daily. 300-300- 600 mg today. 300-600-600 tomorrow; 600 mg 3 times daily following day.  May increase to 900 mg 3 times daily if no side effects reported.    If you are experiencing moderate to severe pain, take 400mg  of ibuprofen and 1000mg of acetaminophen at the same time. You may take this combination every six hours as needed.  The maximum daily dose of ibuprofen is 3200 mg. The maximum daily dose of acetaminophen  is 3000 mg.      Recommend interlaminar cervical epidural steroid injection.  Risk of the procedure including bleeding, infection, failed procedure, paralysis, cervical epidural hematoma discussed with the patient.  All questions answered.    Recommend physical therapy after the injection.     Follow-up 2 weeks after the procedure      Lanny Schwab MD, PhD    [facility] [address]  5TH FLOOR  [facility] [address] [phone]  Dept: [phone]

## 2021-03-31 NOTE — LETTER
3/31/2021       RE: Viral Guerin  [address]  [place] [facility] [address]     Dear Colleague,    Thank you for referring your patient, Viral Guerin, to the [facility] [place] at [facility]. Please see a copy of my visit note below.    Viral is a 50 year old who is being evaluated via a billable video visit.      How would you like to obtain your AVS? MyChart  If the video visit is dropped, the invitation should be resent by: Text to cell phone: [phone]  Will anyone else be joining your video visit? Corry Garcia CMA      Video call duration: 20 minutes    Assessment and plan    Patient is a 50-year-old male with past medical history of HIV, smoking, chronic neck pain.  Patient states that he developed acute neck pain approximately 3 weeks ago.  He denies any numbness and tingling in his hands or fingers.  His pain is constant and occasionally radiates to left shoulder and left elbow. He finished a course of oral steroid and has been taking oxycodone for pain. He tried ibuprofen which was not effective.  He was started on  gabapentin 300 mg  3 times daily, which he did not find to be effective.  Oxycodone makes her sleepy takes his pain away but it does not last very long.  He ran out of the oxycodone.  He went to see neurosurgery, who recommended epidural steroid injection. His MRI of cervical spine performed on 3/12/2021 reviewed.  Patient has moderate right neuroforaminal stenosis and moderate to severe left neuroforaminal stenosis at the C6-C7.  At C7-T1, patient is a broad-based posterior disc bulge with moderate-sized posterior lateral disc protrusion.  There is moderate spinal canal stenosis at this level severe right and left foraminal stenosis and severe left foraminal stenosis.      Exam  There is no vitals taken for this visit considering it is a video visit.  General patient is alert  and cooperative.  No acute distress.  Pulmonary: Breathing comfortably.  No shortness of breath noted  Skin: No visible skin lesions or rashes.  Psych: Maintains eye contact  Musculoskeletal: Moving his upper extremity actively.  Freely with good strength.    Diagnosis    Cervical radiculopathy at C6    Cervical disc herniation    Cervical neuroforaminal stenosis    HIV      Plan     Increase the dose gradually to 600 mg 3 times daily. 300-300- 600 mg today. 300-600-600 tomorrow; 600 mg 3 times daily following day.  May increase to 900 mg 3 times daily if no side effects reported.    If you are experiencing moderate to severe pain, take 400mg of ibuprofen and 1000mg of acetaminophen at the same time. You may take this combination every six hours as needed.  The maximum daily dose of ibuprofen is 3200 mg. The maximum daily dose of acetaminophen  is 3000 mg.      Recommend interlaminar cervical epidural steroid injection.  Risk of the procedure including bleeding, infection, failed procedure, paralysis, cervical epidural hematoma discussed with the patient.  All questions answered.    Recommend physical therapy after the injection.     Follow-up 2 weeks after the procedure      Lanny Schwab MD, PhD    [facility] [address] [address] [phone]  Dept: [phone]            EDITH read through the instructions with pt for the recommended procedure:   Interlaminar cervical epidural steroid injection  Pt verbalized understanding to holding appropriate medication per protocol, and was agreeable to NPO policy and needing a .    Anticoagulant: None, Pt advised to hold Ibuprofen for 3 days.     Recommended follow up: 2 weeks with Dr. Schwab.     AVS mailed to the pt.     Allyssa Shoemaker LPN

## 2021-03-31 NOTE — PROGRESS NOTES
Viral is a 50 year old who is being evaluated via a billable video visit.      How would you like to obtain your AVS? MyChart  If the video visit is dropped, the invitation should be resent by: Text to cell phone: [phone]  Will anyone else be joining your video visit? oCrry Garcia [facility]

## 2021-04-13 ENCOUNTER — VIRTUAL VISIT (OUTPATIENT)
Dept: NEUROSURGERY | Facility: CLINIC | Age: 51
End: 2021-04-13
Payer: COMMERCIAL

## 2021-04-13 DIAGNOSIS — M54.12 CERVICAL RADICULOPATHY: ICD-10-CM

## 2021-04-13 DIAGNOSIS — M54.2 ACUTE NECK PAIN: ICD-10-CM

## 2021-04-13 DIAGNOSIS — M48.02 NEUROFORAMINAL STENOSIS OF CERVICAL SPINE: Primary | ICD-10-CM

## 2021-04-13 PROCEDURE — 99207 PR NO CHARGE LOS: CPT | Performed by: NURSE PRACTITIONER

## 2021-04-13 NOTE — PROGRESS NOTES
Spoke to patient via telephone today.   He was last seen in the Neurosurgery clinic by myself on 3/23/2021  With the following recommendations:  ~Referral to [facility] for epidural steroid injection of cervical spine   ~Trial of Gabapentin titrate to 300 mg three times a day (Sent to [facility])  ~Will follow up following Cervical spine injection  ~Will likely refer to physical therapy following injection when pain/inflammation has settled down    He was seen by Dr. Schwab on 3/31 who recommended:    Plan     Increase the dose gradually to 600 mg 3 times daily. 300-300- 600 mg today. 300-600-600 tomorrow; 600 mg 3 times daily following day.  May increase to 900 mg 3 times daily if no side effects reported.    If you are experiencing moderate to severe pain, take 400mg of ibuprofen and 1000mg of acetaminophen at the same time. You may take this combination every six hours as needed.  The maximum daily dose of ibuprofen is 3200 mg. The maximum daily dose of acetaminophen  is 3000 mg.      Recommend interlaminar cervical epidural steroid injection.  Risk of the procedure including bleeding, infection, failed procedure, paralysis, cervical epidural hematoma discussed with the patient.  All questions answered.    Recommend physical therapy after the injection.     Follow-up 2 weeks after the procedure    Unfortunately, his insurance will not cover an injection until he has done Physical therapy.   He would consider paying out-of-pocket for injection depending on cost.   He has not yet heard from anyone regarding scheduling physical therapy.     No need for appointment today.   Will see him back after therapy and injection.   Will contact the department to help assist him w/scheduling.     Deedee Collins DNP  Neurosurgery Nurse Practitioner  [facility]  [phone]

## 2021-04-13 NOTE — LETTER
4/13/2021       RE: Viral Guerin  [address]  [facility] [facility] [address]     Dear Colleague,    Thank you for referring your patient, Viral Guerin, to the [facility]UROSURG[facility]IC [place] at [facility]. Please see a copy of my visit note below.    Spoke to patient via telephone today.   He was last seen in the Neurosurgery clinic by myself on 3/23/2021  With the following recommendations:  ~Referral to [facility] for epidural steroid injection of cervical spine   ~Trial of Gabapentin titrate to 300 mg three times a day (Sent to [facility] pharmacy)  ~Will follow up following Cervical spine injection  ~Will likely refer to physical therapy following injection when pain/inflammation has settled down    He was seen by Dr. Schwab on 3/31 who recommended:    Plan     Increase the dose gradually to 600 mg 3 times daily. 300-300- 600 mg today. 300-600-600 tomorrow; 600 mg 3 times daily following day.  May increase to 900 mg 3 times daily if no side effects reported.    If you are experiencing moderate to severe pain, take 400mg of ibuprofen and 1000mg of acetaminophen at the same time. You may take this combination every six hours as needed.  The maximum daily dose of ibuprofen is 3200 mg. The maximum daily dose of acetaminophen  is 3000 mg.      Recommend interlaminar cervical epidural steroid injection.  Risk of the procedure including bleeding, infection, failed procedure, paralysis, cervical epidural hematoma discussed with the patient.  All questions answered.    Recommend physical therapy after the injection.     Follow-up 2 weeks after the procedure    Unfortunately, his insurance will not cover an injection until he has done Physical therapy.   He would consider paying out-of-pocket for injection depending on cost.   He has not yet heard from anyone regarding scheduling physical therapy.     No need for appointment today.   Will see  him back after therapy and injection.   Will contact the department to help assist him w/scheduling.     Deedee Collins DNP  Neurosurgery Nurse Practitioner  [facility]  [phone]

## 2021-04-15 ENCOUNTER — TELEPHONE (OUTPATIENT)
Dept: ANESTHESIOLOGY | Facility: CLINIC | Age: 51
End: 2021-04-15

## 2021-04-15 DIAGNOSIS — M54.12 CERVICAL RADICULOPATHY: Primary | ICD-10-CM

## 2021-04-16 RX ORDER — OXYCODONE HYDROCHLORIDE 5 MG/1
5 TABLET ORAL DAILY PRN
Qty: 10 TABLET | Refills: 0 | Status: SHIPPED | OUTPATIENT
Start: 2021-04-16 | End: 2021-10-27

## 2021-04-19 ENCOUNTER — TELEPHONE (OUTPATIENT)
Dept: ANESTHESIOLOGY | Facility: CLINIC | Age: 51
End: 2021-04-19

## 2021-04-19 DIAGNOSIS — Z11.59 ENCOUNTER FOR SCREENING FOR OTHER VIRAL DISEASES: ICD-10-CM

## 2021-04-19 LAB
SARS-COV-2 RNA RESP QL NAA+PROBE: NORMAL
SPECIMEN SOURCE: NORMAL

## 2021-04-19 PROCEDURE — U0005 INFEC AGEN DETEC AMPLI PROBE: HCPCS | Mod: 90 | Performed by: PATHOLOGY

## 2021-04-19 PROCEDURE — 99000 SPECIMEN HANDLING OFFICE-LAB: CPT | Performed by: PATHOLOGY

## 2021-04-19 PROCEDURE — U0003 INFECTIOUS AGENT DETECTION BY NUCLEIC ACID (DNA OR RNA); SEVERE ACUTE RESPIRATORY SYNDROME CORONAVIRUS 2 (SARS-COV-2) (CORONAVIRUS DISEASE [COVID-19]), AMPLIFIED PROBE TECHNIQUE, MAKING USE OF HIGH THROUGHPUT TECHNOLOGIES AS DESCRIBED BY CMS-2020-01-R: HCPCS | Mod: 90 | Performed by: PATHOLOGY

## 2021-04-19 NOTE — TELEPHONE ENCOUNTER
LPN called and spoke to the pt. Dr. Schwab asked that LPN reach out to the pt to inform them of the Oxycodone prescription that was sent to the pharmacy.   LPN also reviewed the pre-procedure instructions with the pt. He was reminded to hold his Ibuprofen for 3 days before their upcoming procedure on 4/22/21 with Dr. Schwab.     Pt Verbalized understanding and denied having any other questions at this time.     Allyssa Shoemaker LPN

## 2021-04-20 LAB
LABORATORY COMMENT REPORT: NORMAL
SARS-COV-2 RNA RESP QL NAA+PROBE: NEGATIVE
SPECIMEN SOURCE: NORMAL

## 2021-04-22 ENCOUNTER — ANCILLARY PROCEDURE (OUTPATIENT)
Dept: RADIOLOGY | Facility: AMBULATORY SURGERY CENTER | Age: 51
End: 2021-04-22
Attending: ANESTHESIOLOGY
Payer: COMMERCIAL

## 2021-04-22 ENCOUNTER — HOSPITAL ENCOUNTER (OUTPATIENT)
Facility: AMBULATORY SURGERY CENTER | Age: 51
Discharge: HOME | End: 2021-04-22
Attending: ANESTHESIOLOGY | Admitting: ANESTHESIOLOGY
Payer: COMMERCIAL

## 2021-04-22 VITALS
BODY MASS INDEX: 32.74 KG/M2 | OXYGEN SATURATION: 98 % | HEART RATE: 98 BPM | WEIGHT: 216 LBS | SYSTOLIC BLOOD PRESSURE: 128 MMHG | TEMPERATURE: 98 F | HEIGHT: 68 IN | DIASTOLIC BLOOD PRESSURE: 84 MMHG | RESPIRATION RATE: 18 BRPM

## 2021-04-22 DIAGNOSIS — M48.02 NEUROFORAMINAL STENOSIS OF CERVICAL SPINE: ICD-10-CM

## 2021-04-22 DIAGNOSIS — M54.12 CERVICAL RADICULOPATHY AT C6: ICD-10-CM

## 2021-04-22 DIAGNOSIS — R52 PAIN: ICD-10-CM

## 2021-04-22 PROCEDURE — 62321 NJX INTERLAMINAR CRV/THRC: CPT

## 2021-04-22 RX ORDER — LIDOCAINE HYDROCHLORIDE 10 MG/ML
INJECTION, SOLUTION EPIDURAL; INFILTRATION; INTRACAUDAL; PERINEURAL PRN
Status: DISCONTINUED | OUTPATIENT
Start: 2021-04-22 | End: 2021-04-22 | Stop reason: HOSPADM

## 2021-04-22 RX ORDER — BUPIVACAINE HYDROCHLORIDE 2.5 MG/ML
INJECTION, SOLUTION EPIDURAL; INFILTRATION; INTRACAUDAL PRN
Status: DISCONTINUED | OUTPATIENT
Start: 2021-04-22 | End: 2021-04-22 | Stop reason: HOSPADM

## 2021-04-22 RX ORDER — DEXAMETHASONE SODIUM PHOSPHATE 10 MG/ML
INJECTION INTRAMUSCULAR; INTRAVENOUS PRN
Status: DISCONTINUED | OUTPATIENT
Start: 2021-04-22 | End: 2021-04-22 | Stop reason: HOSPADM

## 2021-04-22 RX ORDER — IOPAMIDOL 408 MG/ML
INJECTION, SOLUTION INTRATHECAL PRN
Status: DISCONTINUED | OUTPATIENT
Start: 2021-04-22 | End: 2021-04-22 | Stop reason: HOSPADM

## 2021-04-22 ASSESSMENT — MIFFLIN-ST. JEOR: SCORE: 1811.33

## 2021-04-22 NOTE — H&P
"Abbreviated History and Physical    Patient Name: Viral Guerin  YOB: 1970    Reason for Procedure: cervical radiculopathy     History:    Past Medical History:   Diagnosis Date     Childhood asthma     Resolved.     Finger fracture, left ~ 1991.     Human immunodeficiency virus (HIV) disease (H) Diagnosed 6/11/10    CD4+ cell count never below 200, no AIDS sequelae.  Started therapy with [facility] 7/2/10.     Other chronic pain        History of obstructive sleep apnea? no    History of problems with sedation? no    Physical exam:  BP (!) 149/105   Pulse 116   Temp 98  F (36.7  C) (Temporal)   Resp 16   Ht 1.715 m (5' 7.5\")   Wt 98 kg (216 lb)   SpO2 98%   BMI 33.33 kg/m    General: in no apparent distress   Neuro: At least antigravity strength noted in all 4 extremities  Respiratory: Clear to ausculation bilaterally   Cardiac: Regular rate and rhythm  Skin: No rashes or lesions on exposed areas of skin    Medications:   Current Outpatient Medications   Medication     emtricitabine-rilpivirine-tenofovir (ODEFSEY) 200-25-25 MG TABS per tablet     gabapentin (NEURONTIN) 300 MG capsule     Multiple Vitamin (ONE-A-DAY MENS) TABS     oxyCODONE (ROXICODONE) 5 MG tablet     albuterol (PROAIR HFA) 108 (90 Base) MCG/ACT Inhaler     No current facility-administered medications for this encounter.        Allergies:     Allergies   Allergen Reactions     Shellfish-Derived Products Swelling     Mouth and tongue will swell up even touching       ASA Classification: 3    OK for local anesthetic use.     Lanny Schwab MD  Pain Medicine  [facility] Department of Anesthesia  4/22/2021      "

## 2021-04-22 NOTE — OP NOTE
Patient: Viral Guerin Age: 50 year old   MRN: [id number] Attending: Dr. Schwab     Date of Visit: April 22, 2021      PAIN MEDICINE CLINIC PROCEDURE NOTE    ATTENDING CLINICIAN:    Lanny Schwab MD    PREPROCEDURE DIAGNOSES:  1.  Cervical radiculopathy  2.  Chronic neck pain radiating to the upper extremity      PROCEDURE(S) PERFORMED:  1.  Interlaminar cervical epidural steroid injection  2.  Fluoroscopic guidance for the above-named procedure(s)      ANESTHESIA:  Local.    BLOOD LOSS:  Minimal.    DRAINS AND SPECIMENS:  None.    COMPLICATIONS:  None.    INDICATIONS:  Viral Guerin is a 50 year old male with a history of  chronic neck pain radiating to the upper extremity.  The patient stated that the patient was in their usual state of health and denied recent anticoagulant use or recent infections.  Therefore, the plan is for the above mentioned procedure.     Procedure Details:  The patient was met in the procedure room, where the patient was identified by name, medical record number and date of birth.  All of the patient s last minute questions were answered. Written informed consent was obtained and saved in the electronic medical record, after the risks, benefits, and alternatives were discussed with the patient.      A formal time-out procedure was performed, as per protocol, including patient name, title of procedure, and site of procedure, and all in the room concurred.  Routine monitors were applied.      The patient was placed in the prone position on the procedure room table.  All pressure points were checked and comfortably padded.  Routine monitors were placed.  Vital signs were stable.    A chlorhexidine prep was completed followed by sterile draping per standard procedure.     The AP fluoroscopic view was optimized for approach at  C7-T1 interspace.  The skin over the interspace was infiltrated with 4-5 mL of 1% Lidocaine using a 25 gauge, 1.5 inch needle.  A 22-gauge 3-1/2 inch Tuohy needle was  advanced midline between C7-T1 interlaminar space using the loss of resistance technique with preservative free normal saline with fluoroscopic guidance. Mutiple AP, contralateral oblique, and lateral fluoroscopic images are taken as Tuohy needle was advanced to the epidural space. The epidural space was identified, without evidence of blood, cerebrospinal fluid, or parasthesia throughout. Needle tip placement within the epidural space was further confirmed with 1-2 mL of nonionic contrast agent, with the epidural space visualized in the AP, contralateral oblique, and lateral fluoroscopic view(s) with appropriate spread of the agent with fat vacuolization and no intravascular or intrathecal spread noted.  Next, 6 mL of a treatment solution containing 1 mL of preservative dexamethasone 10mg/ml, 1 mL 0.25% and 4 mL preservative free normal saline was administered. The needle was withdrawn.    Light pressure was held at the puncture site(s) to prevent ecchymosis and oozing.  The patient's skin was cleansed, and hemostasis was confirmed.  Band-aids were applied to the needle injection site(s).      Condition:    The patient remained awake and alert throughout the procedure.  The patient tolerated the procedure well and was monitored for approximately 15 minutes afterward in the post procedure area.  There were no immediate post procedure complications noted.  The patient was then discharged to home as per protocol.      Pre-procedure pain score: 10/10  Post-procedure pain score: 0/10

## 2021-04-22 NOTE — DISCHARGE INSTRUCTIONS
PAIN INJECTION HOME CARE INSTRUCTIONS  Activity  -You may resume most normal activity levels with the exception of strenuous activity. It may help to move in ways that hurt before the injection, to see if the pain is still there, but do not overdo it. Take it easy for the rest of the day.    -DO NOT remove bandaid for 24 hours  -DO NOT shower for 24 hours      Pain  -You may feel immediate pain relief and numbness for a period of time after the injection. This may indicate the medication has reached the right spot.  -Your pain may return after this short pain-free period, or may even be a little worse for a day or two. It may be caused by needle irritation or by the medication itself. The medications usually take two or three days to start working, but can take as long as a week.    -You may use an ice pack for 20 minutes every 2 hours for the first 24 hours  -You may use a heating pad after the first 24 hours  -You may use Tylenol (acetaminophen) every 4 hours or other pain medicines as directed by your physician          DID YOU RECEIVE STEROIDS TODAY?  YES    Common side effects of steroids:  Not everyone will experience corticosteroid side effects. If side effects are experienced, they will gradually subside in the 7-10 day period following an injection. Most common side effects include:  -Flushed face and/or chest  -Feeling of warmth, particularly in the face but could be an overall feeling of warmth  -Increased blood sugar in diabetic patients  -Menstrual irregularities my occur. If taking hormone-based birth control an alternate method of birth control is recommended  -Sleep disturbances and/or mood swings are possible  -Leg cramps    PLEASE KEEP TRACK OF YOUR SYMPTOMS AND NOTE ANY CHANGES FOR YOUR DOCTOR.       Please contact us if you have:  -Severe pain  -Fever more than 101.5 degrees Fahrenheit  -Signs of infection at the injection site (redness, swelling, or drainage)    If you have questions, please  contact the Pain Clinic at [phone] Option #1 between the hours of 7:00 am and 3:00 pm Monday through Friday. After office hours you can contact the on call provider by dialing [phone]. If you need immediate attention, we recommend that you go to a hospital emergency room or dial [phone].    For patients seen by the PM and R service, please call [phone].    If you have procedure scheduling questions please call [phone] Option #2

## 2021-05-14 ENCOUNTER — TELEPHONE (OUTPATIENT)
Dept: PHARMACY | Facility: CLINIC | Age: 51
End: 2021-05-14

## 2021-05-14 NOTE — TELEPHONE ENCOUNTER
Called patient for refill reminder.    [facility]   90  day supply       Last Filled on: 02/22/21   Follow-up Date: 08/13/21    Marlene Calzada CPhT  [facility]  [phone]

## 2021-05-27 ENCOUNTER — OFFICE VISIT (OUTPATIENT)
Dept: ANESTHESIOLOGY | Facility: CLINIC | Age: 51
End: 2021-05-27
Payer: COMMERCIAL

## 2021-05-27 VITALS
DIASTOLIC BLOOD PRESSURE: 92 MMHG | WEIGHT: 216 LBS | BODY MASS INDEX: 32.74 KG/M2 | HEART RATE: 90 BPM | SYSTOLIC BLOOD PRESSURE: 136 MMHG | RESPIRATION RATE: 16 BRPM | HEIGHT: 68 IN

## 2021-05-27 DIAGNOSIS — M54.2 ACUTE NECK PAIN: Primary | ICD-10-CM

## 2021-05-27 DIAGNOSIS — M48.02 NEUROFORAMINAL STENOSIS OF CERVICAL SPINE: ICD-10-CM

## 2021-05-27 DIAGNOSIS — I77.9: ICD-10-CM

## 2021-05-27 PROCEDURE — 99213 OFFICE O/P EST LOW 20 MIN: CPT | Mod: GC | Performed by: ANESTHESIOLOGY

## 2021-05-27 RX ORDER — GABAPENTIN 600 MG/1
600 TABLET ORAL 3 TIMES DAILY
Qty: 90 TABLET | Refills: 0 | Status: SHIPPED | OUTPATIENT
Start: 2021-05-27 | End: 2021-08-26

## 2021-05-27 ASSESSMENT — ENCOUNTER SYMPTOMS
COUGH: 0
BLURRED VISION: 0
PALPITATIONS: 0
VOMITING: 0
DOUBLE VISION: 0
WEIGHT LOSS: 0
DYSURIA: 0
CHILLS: 0
FEVER: 0
SHORTNESS OF BREATH: 0
NAUSEA: 0
ABDOMINAL PAIN: 0

## 2021-05-27 ASSESSMENT — PAIN SCALES - GENERAL: PAINLEVEL: EXTREME PAIN (8)

## 2021-05-27 ASSESSMENT — MIFFLIN-ST. JEOR: SCORE: 1811.33

## 2021-05-27 NOTE — NURSING NOTE
RN read through the instructions with the patient for the recommended procedure: KAYLENE  Patient verbalized understanding to holding appropriate medication per protocol and was agreeable to NPO policy and needing a .    Anticoagulant: None reported    Recommended Follow Up: 4-6 weeks post procedure    Kristen Jolly RN

## 2021-05-27 NOTE — PROGRESS NOTES
.  Pain clinic follow up note    HPI:   Viral Guerin is a 50 year old year old male  who presents to the pain clinic with cervical radicular pain in his left arm, s/p cervical epidural steroid injection on 4/11/21. He reports excellent pain relief for 2 weeks after the injection (from 10/10 pre procedure to a 0-1/10 pain after). Since then his pain has returned and averages about a 7/10. He reports burning/shooting pain down the arm into his left hand. He also relates pins and needles. Pain is constant and worse with movement. Combo of acetaminophen and ibuprofen helps somewhat, bringing his pain down to a 5/10.  Has not been taking gabapentin or oxycodone since procedure as he ran out.     Patient Supplied Answers To the  Pain Questionnaire  No flowsheet data found.    No images are attached to the encounter.    Pain today is 7/10    Patient reports compared to the last visit their pain is improved by following percent:    Pain intensity: 20 %  Pain frequency: 0 %  Duration of pain episodes: 0 %  Falling asleep: 0 %  Staying asleep: 0 %  Ability to work: 20%  Ability to exercise: 20 %  Performing chores: 20 %    New imaging reviewed with the patient:  No new imaging since last visit:   Patient has moderate right neuroforaminal stenosis and moderate to severe left neuroforaminal stenosis at the C6-C7.  At C7-T1, patient is a broad-based posterior disc bulge with moderate-sized posterior lateral disc protrusion.  There is moderate spinal canal stenosis at this level severe right and left foraminal stenosis and severe left foraminal stenosis.    ROS:  Review of Systems   Constitutional: Negative for chills, fever and weight loss.   Eyes: Negative for blurred vision and double vision.   Respiratory: Negative for cough and shortness of breath.    Cardiovascular: Negative for chest pain and palpitations.   Gastrointestinal: Negative for abdominal pain, nausea and vomiting.   Genitourinary: Negative for dysuria.    Skin: Negative for rash.     Significant Medical History:   Past Medical History:   Diagnosis Date     Childhood asthma     Resolved.     Finger fracture, left ~ .     Human immunodeficiency virus (HIV) disease (H) Diagnosed 6/11/10    CD4+ cell count never below 200, no AIDS sequelae.  Started therapy with Atripla 7/2/10.     Other chronic pain      Past Surgical History:  Past Surgical History:   Procedure Laterality Date     INJECT EPIDURAL CERVICAL N/A 2021    Procedure: Interlaminar cervical epidural steroid injection;  Surgeon: Lanny Schwab MD;  Location: [facility] [place]     Family History:  Family History   Problem Relation Age of Onset     Hypertension Other      Glaucoma Mother      Social History:  Social History     Socioeconomic History     Marital status: Single     Spouse name: Not on file     Number of children: Not on file     Years of education: Not on file     Highest education level: Not on file   Occupational History     Not on file   Social Needs     Financial resource strain: Not on file     Food insecurity     Worry: Not on file     Inability: Not on file     Transportation needs     Medical: Not on file     Non-medical: Not on file   Tobacco Use     Smoking status: Former Smoker     Types: Cigarettes     Quit date: 2021     Years since quittin.4     Smokeless tobacco: Never Used   Substance and Sexual Activity     Alcohol use: Not Currently     Drug use: No     Sexual activity: Not on file   Lifestyle     Physical activity     Days per week: Not on file     Minutes per session: Not on file     Stress: Not on file   Relationships     Social connections     Talks on phone: Not on file     Gets together: Not on file     Attends Hoahaoism service: Not on file     Active member of club or organization: Not on file     Attends meetings of clubs or organizations: Not on file     Relationship status: Not on file     Intimate partner violence     Fear of current or ex partner: Not on  "file     Emotionally abused: Not on file     Physically abused: Not on file     Forced sexual activity: Not on file   Other Topics Concern     Parent/sibling w/ CABG, MI or angioplasty before 65F 55M? Not Asked   Social History Narrative    Citizen of [place], moved to [place] in early 2010.  Lives in [facility].  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     Social History     Social History Narrative    Citizen of [place], moved to [place] in early 2010.  Lives in [facility].  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     Allergies:  Allergies   Allergen Reactions     Shellfish-Derived Products Swelling     Mouth and tongue will swell up even touching     Current Medications:   Current Outpatient Medications   Medication Sig Dispense Refill     albuterol (PROAIR HFA) 108 (90 Base) MCG/ACT Inhaler Inhale 2 puffs into the lungs every 4 hours as needed for shortness of breath / dyspnea or wheezing 1 Inhaler 0     emtricitabine-rilpivirine-tenofovir (ODEFSEY) 200-25-25 MG TABS per tablet Take 1 tablet by mouth daily with food 90 tablet 3     gabapentin (NEURONTIN) 600 MG tablet Take 1 tablet (600 mg) by mouth 3 times daily 90 tablet 0     Multiple Vitamin (ONE-A-DAY MENS) TABS Take  by mouth daily.       oxyCODONE (ROXICODONE) 5 MG tablet Take 1 tablet (5 mg) by mouth daily as needed for severe pain 10 tablet 0     Current Pain Medications:  1000mg acetaminophen and 400mg ibuprofen TID up to a max dose of 3000mg of acetaminophen and 3200mg of ibuprofen     Physical Exam:     BP (!) 136/92   Pulse 90   Resp 16   Ht 1.715 m (5' 7.5\")   Wt 98 kg (216 lb)   BMI 33.33 kg/m      General Appearance: No distress, seated comfortably  Mood: Euthymic  HE ENT: Non constricted pupils  Respiratory: Non labored breathing  CVS: Regular rate and rhythm  GI: Soft, non distended, no TTP  Skin: No rashes over exposed skin  MS: " Normal muscle bulk  Neuro: Cranial nerves 2-12 intact  Gait: antalgic, ambulates with out assistance    Pain specific exam:    Pain intensity increased with shoulder/arm movement. Full ROM.     ASSESSMENT AND PLAN:     Encounter Diagnosis:  1. Cervical radiculopathy at C6  2. cervical disk herniation causing neruoforaminal stenosis  3. HIV     Viral Guerin is a 50 year old year old male  who presents to the pain clinic with cervical radiculopathy causing L shoulder and arm pain, s/p cervical epidural steroid injection on 4/22.     RECOMMENDATIONS:     1. Medications: We are prescribing the patient Gabapentin 600mg TID. Dosing, side effects, risks/benefits/alternatives were discussed with the patient in detail. If patient experiences negative side affects at this does, he can cut the tables in half and take 300mg TID. He can also continue taking the 1000mg acetaminophen and 400mg ibuprofen TID at the same time.     2. Procedure: We are scheduling the patient for a repeat cervical epidural steroid injection in the procedure suite. Risks/benefits/alternatives were discussed.     3. Other interventions: We referred the patient to physical therapy.     4. Education:  Patient was educated on the causes of neuropathic pain. He expressed concern for the potential for exercise to cause damage. He was told that he could participate in exercise activity as much as his body would allow. Patient was also educated on the possible surgical options for his pain.      Follow up: 2 weeks after SETH    Patient seen by Mikayla Mckeon MS4    Patient discussed and seen by Dr. Josselin KEY    Physician Attestation   I saw and evaluated Viral Guerin.  I agree with above history, review of systems, physical exam and plan. I have reviewed the content of the documentation and have edited it as needed. I have personally performed the services documented here and the documentation accurately represents those services and the decisions I have  made.       Lanny Schwab MD, PhD    [facility] FOR COMPREHENSIVE PAIN MANAGEMENT [address]  5TH FLOOR  [facility] [address] [phone]  Dept: [phone]

## 2021-05-27 NOTE — PATIENT INSTRUCTIONS
Medications:    gabapentin (NEURONTIN) 600 MG tablet.  Take 1 tablet (600 mg) by mouth 3 times daily. If having side effects may take 1/2 tablet (300 mg).      *Please provide the clinic with a minium of 1 week notice, on all prescription refills.     Referrals:    Physical Therapy Referral placed-   If you have not heard from the scheduling office within 2 business days, please call [phone] for all locations      Procedures:    INJECTION, SPINE, CERVICAL, EPIDURAL.      Call to schedule your procedure: [phone], option #2      Your pre-procedure instructions are below, please call our clinic if you have any questions.      Recommended Follow up:      Follow up in 4-6 weeks after procedure.        Please call [phone], option #1 to schedule your clinic appointment if you don't already have an appointment scheduled.      Procedure Information related to COVID-19    Please call [phone] option #2 to schedule, reschedule, or cancel your procedure appointment.   Phones are answered Monday - Friday from 08:00 - 4:30pm.  Leave a voicemail with your name, birth date, and phone number if no one is available to take your call.     You will need to be tested for COVID-19 within 4 days (96 hours) of your procedure.  You will be called to schedule your COVID test by a central scheduling team. If you have not been contacted to schedule your test within 4 days of the scheduled procedure, call [phone]    Please be aware that the turn around time for the test is approximately 24-72 hours.   If your results are still pending the day of your procedure, you will be notified as soon as possible as the procedure may be cancelled.    Please note: You will only be contacted for positive and pending results.     The procedure center staff will call you several days before the procedure to review important information that you will need to know for the day of the procedure.   Please contact the clinic if you have  further questions about this information.       Information related to Scheduling and Pre-Procedure Instructions:        If you are having a Diagnostic procedure, you will be given a Pain Diary to document your pain relief.   Please fax the completed form to our office.   fax number is [phone]    If you must reschedule your procedure more than two times, you must follow up in clinic before rescheduling again.        Preparing for your procedure    CAUTION - FAILURE TO FOLLOW THESE PRE-PROCEDURE INSTRUCTIONS WILL RESULT IN YOUR PROCEDURE BEING RESCHEDULED.      Your procedure: Cervical Epidural Steroid Injection            You must have a  take you home after your procedure. Transportation by taxi or para-transit is okay as long as you have a responsible adult accompany you. You must provide your 's full name and contact number at time of check in.     Fasting Protocol Please have nothing to eat and drink for 2 hours before the procedure.      Medications If you take any medications, DO NOT STOP. Take your medications as usual the day of your procedure with a sip of water AT LEAST 2 HOURS PRIOR TO ARRIVAL.    Antibiotics If you are currently taking antibiotics, you must complete the entire dose 7 days prior to your scheduled procedure. You must be clear of any signs or symptoms of infection. If you begin antibiotics, please contact our clinic for instructions.     Fever, Chills, or Rash If you experience a fever of higher than 100 degrees, chills, rash, or open wounds during the one week before your procedure, please call the clinic to see if you may proceed with your procedure.      Medication Hold List  **Patients under Cardiology/Neurology care should consult their provider prior to the pain procedure to verify pre-procedure medication instructions. The information below contains general guidelines.**      Blood Thinners If you are taking daily ASPIRIN, PLAVIX, OR OTHER BLOOD THINNERS SUCH  AS COUMADIN/WARFARIN, we will need your prescribing doctor to sign a release permitting you to stop these medications. Once approved by your prescribing doctor - STOP ALL BLOOD THINNERS BASED ON THE TIME TABLE BELOW PRIOR TO YOUR PROCEDURE. If you have been instructed to stop WARFARIN(COUMADIN), you must have an INR lab drawn the day before your procedure. . Your INR must be within normal limits before we can perform your injection. MEDICATIONS CAN BE RESTARTED AFTER YOUR PROCEDURE.    14 DAY HOLD  Ticlid (ticlopidine)    10 DAY HOLD  Effient (Prasugel)    3 DAY HOLD  Xarelto (rivaroxaban) 7 DAY HOLD  Anacin, Bufferin, Ecotrin, Excedrin, Aggrenox (Aspirin)  Brilinta (ticagrelor)  Coumadin (Warfarin)  Pradexa (Dabigatran)  Elmiron (Pentosan)  Plavix (Clopidogrel Bisulfate)  Pletal (Cilostazol)    24 HOUR HOLD  Lovenox (enoxaparin)  Agrylin (Anagrelide)        Non-steroidal Anti-inflammatories (NSAIDs) DO NOT TAKE any non-steroidal anti-inflammatory medications (NSAIDs) listed on the table below. MEDICATIONS CAN BE RESTARTED AFTER YOUR PROCEDURE. Celebrex is OK to take and does not need to be discontinued.     Medications to stop:  3 DAY HOLD  Advil, Motrin (Ibuprofen)  Arthrotec (diciofenac sodium/misoprostol)  Clinoril (Sulindac)  Indocin (Indomethacin)  Lodine (Etodolac)  Toradol (Ketorolac)  Vicoprofen (Hydrocodone and Ibuprofen)  Voltaren (Diclotenac)    14 DAY HOLD  Daypro (Oxaprozin)  Feldene (Piroxicam)   7 DAY HOLD  Aleve (Naproxen sodium)  Darvon compound (contains aspirin)  Naprosyn (Naproxen)  Norgesic Forte (contains aspirin)  Mobic (Meloxicam)  Oruvall (Ketoprofen)  Percodan (contains aspirin)  Relafen (Nabumetone)  Salsalate  Trilisate  Vitamin E (more than 400 mg per day)  Any medication containing aspirin                To speak with a nurse, schedule/reschedule/cancel a clinic appointment, or request a medication refill call: [phone]     You can also reach us by [facility]:  [email or web address]

## 2021-05-27 NOTE — LETTER
5/27/2021       RE: Viral Guerin  [address]  [facility] [address]     Dear Colleague,    Thank you for referring your patient, Viral Guerin, to the [facility] FOR COMPREHENSIVE PAIN MANAGEMENT [place] at [facility]. Please see a copy of my visit note below.    Pain clinic follow up note    HPI:   Viral Guerin is a 50 year old year old male  who presents to the pain clinic with cervical radicular pain in his left arm, s/p cervical epidural steroid injection on 4/11/21. He reports excellent pain relief for 2 weeks after the injection (from 10/10 pre procedure to a 0-1/10 pain after). Since then his pain has returned and averages about a 7/10. He reports burning/shooting pain down the arm into his left hand. He also relates pins and needles. Pain is constant and worse with movement. Combo of acetaminophen and ibuprofen helps somewhat, bringing his pain down to a 5/10.  Has not been taking gabapentin or oxycodone since procedure as he ran out.     Patient Supplied Answers To the  Pain Questionnaire  No flowsheet data found.    No images are attached to the encounter.    Pain today is 7/10    Patient reports compared to the last visit their pain is improved by following percent:    Pain intensity: 20 %  Pain frequency: 0 %  Duration of pain episodes: 0 %  Falling asleep: 0 %  Staying asleep: 0 %  Ability to work: 20%  Ability to exercise: 20 %  Performing chores: 20 %    New imaging reviewed with the patient:  No new imaging since last visit:   Patient has moderate right neuroforaminal stenosis and moderate to severe left neuroforaminal stenosis at the C6-C7.  At C7-T1, patient is a broad-based posterior disc bulge with moderate-sized posterior lateral disc protrusion.  There is moderate spinal canal stenosis at this level severe right and left foraminal stenosis and severe left foraminal stenosis.    ROS:  Review of Systems    Constitutional: Negative for chills, fever and weight loss.   Eyes: Negative for blurred vision and double vision.   Respiratory: Negative for cough and shortness of breath.    Cardiovascular: Negative for chest pain and palpitations.   Gastrointestinal: Negative for abdominal pain, nausea and vomiting.   Genitourinary: Negative for dysuria.   Skin: Negative for rash.     Significant Medical History:   Past Medical History:   Diagnosis Date     Childhood asthma     Resolved.     Finger fracture, left ~ .     Human immunodeficiency virus (HIV) disease (H) Diagnosed 6/11/10    CD4+ cell count never below 200, no AIDS sequelae.  Started therapy with Atripla 7/2/10.     Other chronic pain      Past Surgical History:  Past Surgical History:   Procedure Laterality Date     INJECT EPIDURAL CERVICAL N/A 2021    Procedure: Interlaminar cervical epidural steroid injection;  Surgeon: Lanny Schwab MD;  [place] [facility] [place]     Family History:  Family History   Problem Relation Age of Onset     Hypertension Other      Glaucoma Mother      Social History:  Social History     Socioeconomic History     Marital status: Single     Spouse name: Not on file     Number of children: Not on file     Years of education: Not on file     Highest education level: Not on file   Occupational History     Not on file   Social Needs     Financial resource strain: Not on file     Food insecurity     Worry: Not on file     Inability: Not on file     Transportation needs     Medical: Not on file     Non-medical: Not on file   Tobacco Use     Smoking status: Former Smoker     Types: Cigarettes     Quit date: 2021     Years since quittin.4     Smokeless tobacco: Never Used   Substance and Sexual Activity     Alcohol use: Not Currently     Drug use: No     Sexual activity: Not on file   Lifestyle     Physical activity     Days per week: Not on file     Minutes per session: Not on file     Stress: Not on file   Relationships     Social  "connections     Talks on phone: Not on file     Gets together: Not on file     Attends Yarsani service: Not on file     Active member of club or organization: Not on file     Attends meetings of clubs or organizations: Not on file     Relationship status: Not on file     Intimate partner violence     Fear of current or ex partner: Not on file     Emotionally abused: Not on file     Physically abused: Not on file     Forced sexual activity: Not on file   Other Topics Concern     Parent/sibling w/ CABG, MI or angioplasty before 65F 55M? Not Asked   Social History Narrative    Citizen of [place], moved to [place] in early 2010.  Lives in [facility].  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     Social History     Social History Narrative    Citizen of [place], moved to [place] in early 2010.  Lives in [facility].  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     Allergies:  Allergies   Allergen Reactions     Shellfish-Derived Products Swelling     Mouth and tongue will swell up even touching     Current Medications:   Current Outpatient Medications   Medication Sig Dispense Refill     albuterol (PROAIR HFA) 108 (90 Base) MCG/ACT Inhaler Inhale 2 puffs into the lungs every 4 hours as needed for shortness of breath / dyspnea or wheezing 1 Inhaler 0     emtricitabine-rilpivirine-tenofovir (ODEFSEY) 200-25-25 MG TABS per tablet Take 1 tablet by mouth daily with food 90 tablet 3     gabapentin (NEURONTIN) 600 MG tablet Take 1 tablet (600 mg) by mouth 3 times daily 90 tablet 0     Multiple Vitamin (ONE-A-DAY MENS) TABS Take  by mouth daily.       oxyCODONE (ROXICODONE) 5 MG tablet Take 1 tablet (5 mg) by mouth daily as needed for severe pain 10 tablet 0     Current Pain Medications:  1000mg acetaminophen and 400mg ibuprofen TID up to a max dose of 3000mg of acetaminophen and 3200mg of ibuprofen     Physical Exam: " "    BP (!) 136/92   Pulse 90   Resp 16   Ht 1.715 m (5' 7.5\")   Wt 98 kg (216 lb)   BMI 33.33 kg/m      General Appearance: No distress, seated comfortably  Mood: Euthymic  HE ENT: Non constricted pupils  Respiratory: Non labored breathing  CVS: Regular rate and rhythm  GI: Soft, non distended, no TTP  Skin: No rashes over exposed skin  MS: Normal muscle bulk  Neuro: Cranial nerves 2-12 intact  Gait: antalgic, ambulates with out assistance    Pain specific exam:    Pain intensity increased with shoulder/arm movement. Full ROM.     ASSESSMENT AND PLAN:     Encounter Diagnosis:  1. Cervical radiculopathy at C6  2. cervical disk herniation causing neruoforaminal stenosis  3. HIV     Viral Rosamaria Guerin is a 50 year old year old male  who presents to the pain clinic with cervical radiculopathy causing L shoulder and arm pain, s/p cervical epidural steroid injection on 4/22.     RECOMMENDATIONS:     1. Medications: We are prescribing the patient Gabapentin 600mg TID. Dosing, side effects, risks/benefits/alternatives were discussed with the patient in detail. If patient experiences negative side affects at this does, he can cut the tables in half and take 300mg TID. He can also continue taking the 1000mg acetaminophen and 400mg ibuprofen TID at the same time.     2. Procedure: We are scheduling the patient for a repeat cervical epidural steroid injection in the procedure suite. Risks/benefits/alternatives were discussed.     3. Other interventions: We referred the patient to physical therapy.     4. Education:  Patient was educated on the causes of neuropathic pain. He expressed concern for the potential for exercise to cause damage. He was told that he could participate in exercise activity as much as his body would allow. Patient was also educated on the possible surgical options for his pain.      Follow up: 2 weeks after SETH    Patient seen by Mikayla Mckeon MS4    Patient discussed and seen by Dr. Schwab " MD    Physician Attestation   I saw and evaluated Viral Guerin.  I agree with above history, review of systems, physical exam and plan. I have reviewed the content of the documentation and have edited it as needed. I have personally performed the services documented here and the documentation accurately represents those services and the decisions I have made.       Lanny Schwab MD, PhD    [facility] FOR COMPREHENSIVE PAIN MANAGEMENT [address]  5TH FLOOR  [facility] [id number] [phone]  Dept: [phone]

## 2021-06-07 DIAGNOSIS — Z11.59 ENCOUNTER FOR SCREENING FOR OTHER VIRAL DISEASES: ICD-10-CM

## 2021-06-07 PROBLEM — I77.9: Status: ACTIVE | Noted: 2021-06-07

## 2021-07-26 ENCOUNTER — LAB (OUTPATIENT)
Dept: LAB | Facility: CLINIC | Age: 51
End: 2021-07-26
Attending: ANESTHESIOLOGY
Payer: COMMERCIAL

## 2021-07-26 DIAGNOSIS — Z11.59 ENCOUNTER FOR SCREENING FOR OTHER VIRAL DISEASES: ICD-10-CM

## 2021-07-26 LAB — SARS-COV-2 RNA RESP QL NAA+PROBE: NEGATIVE

## 2021-07-26 PROCEDURE — U0005 INFEC AGEN DETEC AMPLI PROBE: HCPCS | Performed by: ANESTHESIOLOGY

## 2021-07-29 ENCOUNTER — ANCILLARY PROCEDURE (OUTPATIENT)
Dept: RADIOLOGY | Facility: AMBULATORY SURGERY CENTER | Age: 51
End: 2021-07-29
Attending: ANESTHESIOLOGY
Payer: COMMERCIAL

## 2021-07-29 ENCOUNTER — HOSPITAL ENCOUNTER (OUTPATIENT)
Facility: AMBULATORY SURGERY CENTER | Age: 51
Discharge: HOME | End: 2021-07-29
Attending: ANESTHESIOLOGY | Admitting: ANESTHESIOLOGY
Payer: COMMERCIAL

## 2021-07-29 VITALS
WEIGHT: 216 LBS | HEART RATE: 72 BPM | DIASTOLIC BLOOD PRESSURE: 76 MMHG | OXYGEN SATURATION: 98 % | BODY MASS INDEX: 32.74 KG/M2 | SYSTOLIC BLOOD PRESSURE: 115 MMHG | RESPIRATION RATE: 16 BRPM | HEIGHT: 68 IN | TEMPERATURE: 97.3 F

## 2021-07-29 DIAGNOSIS — I77.9: ICD-10-CM

## 2021-07-29 DIAGNOSIS — R52 PAIN: ICD-10-CM

## 2021-07-29 PROCEDURE — 62321 NJX INTERLAMINAR CRV/THRC: CPT

## 2021-07-29 PROCEDURE — 62321 NJX INTERLAMINAR CRV/THRC: CPT | Performed by: ANESTHESIOLOGY

## 2021-07-29 RX ORDER — BUPIVACAINE HYDROCHLORIDE 5 MG/ML
INJECTION, SOLUTION PERINEURAL PRN
Status: DISCONTINUED | OUTPATIENT
Start: 2021-07-29 | End: 2021-07-29 | Stop reason: HOSPADM

## 2021-07-29 RX ORDER — LIDOCAINE HYDROCHLORIDE 10 MG/ML
INJECTION, SOLUTION EPIDURAL; INFILTRATION; INTRACAUDAL; PERINEURAL PRN
Status: DISCONTINUED | OUTPATIENT
Start: 2021-07-29 | End: 2021-07-29 | Stop reason: HOSPADM

## 2021-07-29 ASSESSMENT — MIFFLIN-ST. JEOR: SCORE: 1811.33

## 2021-07-29 NOTE — DISCHARGE INSTRUCTIONS
"PAIN INJECTION HOME CARE INSTRUCTIONS  Activity  -You may resume most normal activity levels with the exception of strenuous activity. It may help to move in ways that hurt before the injection, to see if the pain is still there, but do not overdo it. Take it easy for the rest of the day.    -DO NOT remove bandaid for 24 hours  -DO NOT shower for 24 hours      Pain  -You may feel immediate pain relief and numbness for a period of time after the injection. This may indicate the medication has reached the right spot.  -Your pain may return after this short pain-free period, or may even be a little worse for a day or two. It may be caused by needle irritation or by the medication itself. The medications usually take two or three days to start working, but can take as long as a week.    -You may use an ice pack for 20 minutes every 2 hours for the first 24 hours  -You may use a heating pad after the first 24 hours  -You may use Tylenol (acetaminophen) every 4 hours or other pain medicines as directed by your physician      DID YOU RECEIVE STEROIDS TODAY?  {YES / NO:[id number]::\"Yes\"}    Common side effects of steroids:  Not everyone will experience corticosteroid side effects. If side effects are experienced, they will gradually subside in the 7-10 day period following an injection. Most common side effects include:  -Flushed face and/or chest  -Feeling of warmth, particularly in the face but could be an overall feeling of warmth  -Increased blood sugar in diabetic patients  -Menstrual irregularities my occur. If taking hormone-based birth control an alternate method of birth control is recommended  -Sleep disturbances and/or mood swings are possible  -Leg cramps    PLEASE KEEP TRACK OF YOUR SYMPTOMS AND NOTE ANY CHANGES FOR YOUR DOCTOR.       Please contact us if you have:  -Severe pain  -Fever more than 101.5 degrees Fahrenheit  -Signs of infection at the injection site (redness, swelling, or drainage)    If you have " questions, please contact the Pain Clinic at [phone] Option #1 between the hours of 7:00 am and 3:00 pm Monday through Friday. After office hours you can contact the on call provider by dialing [phone]. If you need immediate attention, we recommend that you go to a hospital emergency room or dial [phone].    For patients seen by the PM and R service, please call [phone].    If you have procedure scheduling questions please call [phone] Option #2

## 2021-07-29 NOTE — OP NOTE
Patient: Viral Guerin Age: 50 year old   MRN: [id number] Attending: Dr. Schwab         PAIN MEDICINE CLINIC PROCEDURE NOTE     ATTENDING CLINICIAN:    Lanny Schwab MD     PREPROCEDURE DIAGNOSES:  1.  Cervical radiculopathy  2.  Chronic neck pain radiating to the upper extremity        PROCEDURE(S) PERFORMED:  1.  Interlaminar cervical epidural steroid injection (C7-T1 approach)  2.  Fluoroscopic guidance for the above-named procedure(s)        ANESTHESIA:  Local.     BLOOD LOSS:  Minimal.     DRAINS AND SPECIMENS:  None.     COMPLICATIONS:  None.     INDICATIONS:  Viral Guerin is a 50 year old male with a history of  chronic neck pain radiating to the upper extremity.  The patient stated that the patient was in their usual state of health and denied recent anticoagulant use or recent infections.  Therefore, the plan is for the above mentioned procedure.      Procedure Details:  The patient was met in the procedure room, where the patient was identified by name, medical record number and date of birth.  All of the patient s last minute questions were answered. Written informed consent was obtained and saved in the electronic medical record, after the risks, benefits, and alternatives were discussed with the patient.       A formal time-out procedure was performed, as per protocol, including patient name, title of procedure, and site of procedure, and all in the room concurred.  Routine monitors were applied.       The patient was placed in the prone position on the procedure room table.  All pressure points were checked and comfortably padded.  Routine monitors were placed.  Vital signs were stable.     A chlorhexidine prep was completed followed by sterile draping per standard procedure.      The AP fluoroscopic view was optimized for approach at  C7-T1 interspace.  The skin over the interspace was infiltrated with 4-5 mL of 1% Lidocaine using a 25 gauge, 1.5 inch needle.  A 18 -gauge 3-1/2 inch Tuohy needle  [facility] will contact you to arrange your follow up appointment and testing.  If you have any questions or concerns the clinic phone number is [phone].   was advanced midline between C7-T1 interlaminar space using the loss of resistance technique with preservative free normal saline with fluoroscopic guidance. Mutiple AP, contralateral oblique, and lateral fluoroscopic images are taken as Tuohy needle was advanced to the epidural space. The epidural space was identified, without evidence of blood, cerebrospinal fluid, or parasthesia throughout. Needle tip placement within the epidural space was further confirmed with 1-2 mL of nonionic contrast agent, with the epidural space visualized in the AP, contralateral oblique, and lateral fluoroscopic view(s) with appropriate spread of the agent with fat vacuolization and no intravascular or intrathecal spread noted.  Next, 6 mL of a treatment solution containing 1 mL of preservative dexamethasone 10mg/ml, 1 mL 0.25% and 4 mL preservative free normal saline was administered. The needle was withdrawn.     Light pressure was held at the puncture site(s) to prevent ecchymosis and oozing.  The patient's skin was cleansed, and hemostasis was confirmed.  Band-aids were applied to the needle injection site(s).       Condition:     The patient remained awake and alert throughout the procedure.  The patient tolerated the procedure well and was monitored for approximately 15 minutes afterward in the post procedure area.  There were no immediate post procedure complications noted.  The patient was then discharged to home as per protocol.

## 2021-08-11 ENCOUNTER — TELEPHONE (OUTPATIENT)
Dept: PHARMACY | Facility: CLINIC | Age: 51
End: 2021-08-11

## 2021-08-11 DIAGNOSIS — M54.2 ACUTE NECK PAIN: ICD-10-CM

## 2021-08-11 DIAGNOSIS — M48.02 NEUROFORAMINAL STENOSIS OF CERVICAL SPINE: ICD-10-CM

## 2021-08-11 NOTE — TELEPHONE ENCOUNTER
Called patient for refill reminder.    Will mail prescriptions address has been verified.   90 day supply         Last Filled on: 05/14/21   Follow-up Date: 11/11/21    Bret Romero  Pharmacy Intern   [facility]

## 2021-08-12 ENCOUNTER — TELEPHONE (OUTPATIENT)
Dept: INFECTIOUS DISEASES | Facility: CLINIC | Age: 51
End: 2021-08-12

## 2021-08-12 NOTE — TELEPHONE ENCOUNTER
[facility] Call Center    Phone Message    May a detailed message be left on voicemail: yes     Reason for Call: Other: Pt of Dr. Higgins's calling in asking if the care team could ask Dr. Higgins if it would be ok for the pt to get the Covid vaccine with the type of medication the pt currently takes for his dx.      Pt will wait until he hears back from the care team before he schedule the vaccine appt.    Action Taken: Message routed to:  [facility] ID    Travel Screening: Not Applicable

## 2021-08-13 NOTE — TELEPHONE ENCOUNTER
Writer received a refill request from: [facility].     Pending Prescriptions:                       Disp   Refills    gabapentin (NEURONTIN) 600 MG tablet [Pha*90 tab*0            Sig: TAKE 1 TABLET (600 MG) BY MOUTH THREE TIMES A DAY    Date last dispensed: 5/27/2021      Pt's last office visit: 5/27/2021  Next scheduled office visit: 09/16/2021      Per the RN/LPN medication refill protocol, writer is unable to refill this request.     (If able to refill, please call the pt to inform them the RX was sent to the pharmacy. If unable to refill, route this encounter to the prescribing physician for authorization or further instructions)  Rosa Orourke LPN

## 2021-08-17 NOTE — TELEPHONE ENCOUNTER
[facility] Call Center    Phone Message    May a detailed message be left on voicemail: yes     Reason for Call: Other: Pt following up on previous message. Per Pt he hasn't gotten a call back from the clinic yet. Please follow up with Pt.     Action Taken: Message routed to:  [facility] id    Travel Screening: Not Applicable

## 2021-08-24 NOTE — TELEPHONE ENCOUNTER
[facility] Call [facility]    Phone Message    May a detailed message be left on voicemail: yes     Reason for Call: Other: Pt following up on his request for a call back from the clinic. Writer confirmed correct number and that it is OK to leave a VM if he doesn't answer. Please try calling back again at: [phone]     Action Taken: Message routed to:  [facility] id    Travel Screening: Not Applicable

## 2021-08-26 RX ORDER — GABAPENTIN 600 MG/1
TABLET ORAL
Qty: 90 TABLET | Refills: 0 | Status: SHIPPED | OUTPATIENT
Start: 2021-08-26 | End: 2022-02-24

## 2021-08-27 NOTE — TELEPHONE ENCOUNTER
"Spoke with patient regarding his question- patient has not been vaccinated against sars-cov-2 at this point in time. Confirmed patient was calling to discuss a first immunization, not \"third shot.\" Patient stated this is correct.   States he saw on TV that people with underlying conditions are at increased risk for hospitalization and severe disease. He is wondering if there is any contraindication with his O[place] and the vaccine. Confirmed with patient there is no contraindication. Discussed vaccinations with patient, he does not prefer one dose vs two. He feels more comfortable with Pfizer or [employer]. Encouraged patient that either vaccine would be a good choice for him- informed him that our pharmacy here at [facility] offers both, the whole process would take ~30 minutes, and it is standard that he be observed for 15 minutes after to make sure he doesn't have a reaction. Patient stated he will go to [facility]'s closer to his home. Patient sounded enthusiastic about getting vaccine this weekend.     "

## 2021-09-16 ENCOUNTER — VIRTUAL VISIT (OUTPATIENT)
Dept: ANESTHESIOLOGY | Facility: CLINIC | Age: 51
End: 2021-09-16
Payer: COMMERCIAL

## 2021-09-16 DIAGNOSIS — M48.02 NEUROFORAMINAL STENOSIS OF CERVICAL SPINE: Primary | ICD-10-CM

## 2021-09-16 PROCEDURE — 99213 OFFICE O/P EST LOW 20 MIN: CPT | Mod: 95 | Performed by: ANESTHESIOLOGY

## 2021-09-16 NOTE — LETTER
9/16/2021       RE: Viral Guerin  [address]  [place] [address]     Dear Colleague,    Thank you for referring your patient, Viral Guerin, to the [facility] [place] at [facility]. Please see a copy of my visit note below.    Telephone call duration: 9 minutes    Viral Guerin is a 50 year old year old male  who presents to the pain clinic with cervical radicular pain in his left arm, s/p cervical epidural steroid injection X 2.  He reports excellent pain relief after the injection.  Currently reports no pain or tingling or numbness.  Overall he is very happy with the procedure outcome.  He has been taking gabapentin 600 mg 3 times daily.     No new imaging since last visit:   Patient has moderate right neuroforaminal stenosis and moderate to severe left neuroforaminal stenosis at the C6-C7.  At C7-T1, patient is a broad-based posterior disc bulge with moderate-sized posterior lateral disc protrusion.  There is moderate spinal canal stenosis at this level severe right and left foraminal stenosis and severe left foraminal stenosis.       Current pain medication  1000mg acetaminophen and 400mg ibuprofen TID up to a max dose of 3000mg of acetaminophen and 3200mg of ibuprofen      Physical Exam:           General Appearance: No distress, pleasant in conversation  Psych: Mentation intact  Respiratory: Non labored breathing  Neuro: Alert awake and oriented       ASSESSMENT AND PLAN:      Encounter Diagnosis:  1. Cervical radiculopathy at C6  2. cervical disk herniation causing neruoforaminal stenosis  3. HIV     Viral Guerin is a 50 year old year old male  who presents to the pain clinic with cervical radiculopathy causing L shoulder and arm pain, s/p cervical epidural steroid injection x2     RECOMMENDATIONS:      1. Medications:  Continue gabapentin 600mg TID. Dosing, side effects,  risks/benefits/alternatives were discussed with the patient in detail. If patient experiences negative side affects at this does, he can cut the tables in half and take 300mg TID. He can also continue taking the 1000mg acetaminophen and 400mg ibuprofen TID at the same time.      2. Procedure:  None at this time.  Advised him to call us for repeat cervical epidural steroid injection if his symptoms come back.     3. Other interventions: We referred the patient to physical therapy.      4. Education:  Patient was educated on the causes of neuropathic pain. He expressed concern for the potential for exercise to cause damage. He was told that he could participate in exercise activity as much as his body would allow. Patient was also educated on the possible surgical options for his pain.       Follow up:  4 to 6 months           Lanny Schwab MD, PhD            Again, thank you for allowing me to participate in the care of your patient.      Sincerely,    Lanny Schwab MD

## 2021-09-16 NOTE — PROGRESS NOTES
Telephone call duration: 9 minutes    Viral Guerin is a 50 year old year old male  who presents to the pain clinic with cervical radicular pain in his left arm, s/p cervical epidural steroid injection X 2.  He reports excellent pain relief after the injection.  Currently reports no pain or tingling or numbness.  Overall he is very happy with the procedure outcome.  He has been taking gabapentin 600 mg 3 times daily.     No new imaging since last visit:   Patient has moderate right neuroforaminal stenosis and moderate to severe left neuroforaminal stenosis at the C6-C7.  At C7-T1, patient is a broad-based posterior disc bulge with moderate-sized posterior lateral disc protrusion.  There is moderate spinal canal stenosis at this level severe right and left foraminal stenosis and severe left foraminal stenosis.       Current pain medication  1000mg acetaminophen and 400mg ibuprofen TID up to a max dose of 3000mg of acetaminophen and 3200mg of ibuprofen      Physical Exam:           General Appearance: No distress, pleasant in conversation  Psych: Mentation intact  Respiratory: Non labored breathing  Neuro: Alert awake and oriented       ASSESSMENT AND PLAN:      Encounter Diagnosis:  1. Cervical radiculopathy at C6  2. cervical disk herniation causing neruoforaminal stenosis  3. HIV     Viral Guerin is a 50 year old year old male  who presents to the pain clinic with cervical radiculopathy causing L shoulder and arm pain, s/p cervical epidural steroid injection x2     RECOMMENDATIONS:      1. Medications:  Continue gabapentin 600mg TID. Dosing, side effects, risks/benefits/alternatives were discussed with the patient in detail. If patient experiences negative side affects at this does, he can cut the tables in half and take 300mg TID. He can also continue taking the 1000mg acetaminophen and 400mg ibuprofen TID at the same time.      2. Procedure:  None at this time.  Advised him to call us for repeat cervical  epidural steroid injection if his symptoms come back.     3. Other interventions: We referred the patient to physical therapy.      4. Education:  Patient was educated on the causes of neuropathic pain. He expressed concern for the potential for exercise to cause damage. He was told that he could participate in exercise activity as much as his body would allow. Patient was also educated on the possible surgical options for his pain.       Follow up:  4 to 6 months           Lanny Schwab MD, PhD    [facility] [address] [address] [phone]  Dept: [phone]

## 2021-09-16 NOTE — PATIENT INSTRUCTIONS
Medications:    Continue Gabapentin- 600 mg, three times daily.     Please provide the clinic with a minium of 1 week notice, on all prescription refills.         Treatment planning:    Please call the clinic if you would like to discuss repeating your procedure: Cervical Epidural Steroid Injection      Recommended Follow up:      4-6 months with Dr. Schwab.        To speak with a nurse, schedule/reschedule/cancel a clinic appointment, or request a medication refill call: [phone], option #1.    You can also reach us by Dragonplayt: [email or web address]

## 2021-10-27 ENCOUNTER — OFFICE VISIT (OUTPATIENT)
Dept: INFECTIOUS DISEASES | Facility: CLINIC | Age: 51
End: 2021-10-27
Attending: INTERNAL MEDICINE
Payer: COMMERCIAL

## 2021-10-27 ENCOUNTER — LAB (OUTPATIENT)
Dept: LAB | Facility: CLINIC | Age: 51
End: 2021-10-27
Payer: COMMERCIAL

## 2021-10-27 VITALS
HEART RATE: 83 BPM | TEMPERATURE: 98.6 F | WEIGHT: 211.3 LBS | OXYGEN SATURATION: 98 % | BODY MASS INDEX: 32.61 KG/M2 | DIASTOLIC BLOOD PRESSURE: 83 MMHG | SYSTOLIC BLOOD PRESSURE: 123 MMHG

## 2021-10-27 DIAGNOSIS — Z21 HUMAN IMMUNODEFICIENCY VIRUS I INFECTION (H): ICD-10-CM

## 2021-10-27 DIAGNOSIS — R06.2 WHEEZING: ICD-10-CM

## 2021-10-27 DIAGNOSIS — Z23 NEED FOR INFLUENZA VACCINATION: ICD-10-CM

## 2021-10-27 DIAGNOSIS — Z21 HUMAN IMMUNODEFICIENCY VIRUS I INFECTION (H): Primary | ICD-10-CM

## 2021-10-27 DIAGNOSIS — E78.1 HYPERTRIGLYCERIDEMIA: ICD-10-CM

## 2021-10-27 LAB
ALBUMIN SERPL-MCNC: 4.3 G/DL (ref 3.4–5)
ALP SERPL-CCNC: 64 U/L (ref 40–150)
ALT SERPL W P-5'-P-CCNC: 44 U/L (ref 0–70)
ANION GAP SERPL CALCULATED.3IONS-SCNC: 2 MMOL/L (ref 3–14)
AST SERPL W P-5'-P-CCNC: 26 U/L (ref 0–45)
BASOPHILS # BLD AUTO: 0 10E3/UL (ref 0–0.2)
BASOPHILS NFR BLD AUTO: 0 %
BILIRUB SERPL-MCNC: 0.3 MG/DL (ref 0.2–1.3)
BUN SERPL-MCNC: 18 MG/DL (ref 7–30)
CALCIUM SERPL-MCNC: 9.6 MG/DL (ref 8.5–10.1)
CHLORIDE BLD-SCNC: 110 MMOL/L (ref 94–109)
CHOLEST SERPL-MCNC: 186 MG/DL
CO2 SERPL-SCNC: 28 MMOL/L (ref 20–32)
CREAT SERPL-MCNC: 1 MG/DL (ref 0.66–1.25)
EOSINOPHIL # BLD AUTO: 0.2 10E3/UL (ref 0–0.7)
EOSINOPHIL NFR BLD AUTO: 2 %
ERYTHROCYTE [DISTWIDTH] IN BLOOD BY AUTOMATED COUNT: 12.8 % (ref 10–15)
FASTING STATUS PATIENT QL REPORTED: NO
GFR SERPL CREATININE-BSD FRML MDRD: 88 ML/MIN/1.73M2
GLUCOSE BLD-MCNC: 76 MG/DL (ref 70–99)
HCT VFR BLD AUTO: 44.1 % (ref 40–53)
HDLC SERPL-MCNC: 34 MG/DL
HGB BLD-MCNC: 14.8 G/DL (ref 13.3–17.7)
IMM GRANULOCYTES # BLD: 0 10E3/UL
IMM GRANULOCYTES NFR BLD: 0 %
LDLC SERPL CALC-MCNC: 125 MG/DL
LYMPHOCYTES # BLD AUTO: 4.9 10E3/UL (ref 0.8–5.3)
LYMPHOCYTES NFR BLD AUTO: 45 %
MCH RBC QN AUTO: 31 PG (ref 26.5–33)
MCHC RBC AUTO-ENTMCNC: 33.6 G/DL (ref 31.5–36.5)
MCV RBC AUTO: 92 FL (ref 78–100)
MONOCYTES # BLD AUTO: 0.6 10E3/UL (ref 0–1.3)
MONOCYTES NFR BLD AUTO: 6 %
NEUTROPHILS # BLD AUTO: 5.2 10E3/UL (ref 1.6–8.3)
NEUTROPHILS NFR BLD AUTO: 47 %
NONHDLC SERPL-MCNC: 152 MG/DL
NRBC # BLD AUTO: 0 10E3/UL
NRBC BLD AUTO-RTO: 0 /100
PLATELET # BLD AUTO: 220 10E3/UL (ref 150–450)
POTASSIUM BLD-SCNC: 4.2 MMOL/L (ref 3.4–5.3)
PROT SERPL-MCNC: 8.2 G/DL (ref 6.8–8.8)
RBC # BLD AUTO: 4.78 10E6/UL (ref 4.4–5.9)
SODIUM SERPL-SCNC: 140 MMOL/L (ref 133–144)
TRIGL SERPL-MCNC: 135 MG/DL
WBC # BLD AUTO: 10.9 10E3/UL (ref 4–11)

## 2021-10-27 PROCEDURE — 85025 COMPLETE CBC W/AUTO DIFF WBC: CPT | Performed by: PATHOLOGY

## 2021-10-27 PROCEDURE — 80053 COMPREHEN METABOLIC PANEL: CPT | Performed by: PATHOLOGY

## 2021-10-27 PROCEDURE — 86780 TREPONEMA PALLIDUM: CPT | Mod: 90 | Performed by: PATHOLOGY

## 2021-10-27 PROCEDURE — G0463 HOSPITAL OUTPT CLINIC VISIT: HCPCS

## 2021-10-27 PROCEDURE — 36415 COLL VENOUS BLD VENIPUNCTURE: CPT | Performed by: PATHOLOGY

## 2021-10-27 PROCEDURE — 90686 IIV4 VACC NO PRSV 0.5 ML IM: CPT | Performed by: INTERNAL MEDICINE

## 2021-10-27 PROCEDURE — G0008 ADMIN INFLUENZA VIRUS VAC: HCPCS | Performed by: INTERNAL MEDICINE

## 2021-10-27 PROCEDURE — 86359 T CELLS TOTAL COUNT: CPT | Mod: 90 | Performed by: PATHOLOGY

## 2021-10-27 PROCEDURE — 80061 LIPID PANEL: CPT | Performed by: PATHOLOGY

## 2021-10-27 PROCEDURE — 86360 T CELL ABSOLUTE COUNT/RATIO: CPT | Mod: 90 | Performed by: PATHOLOGY

## 2021-10-27 PROCEDURE — 99213 OFFICE O/P EST LOW 20 MIN: CPT | Performed by: INTERNAL MEDICINE

## 2021-10-27 PROCEDURE — 87536 HIV-1 QUANT&REVRSE TRNSCRPJ: CPT | Mod: 90 | Performed by: PATHOLOGY

## 2021-10-27 PROCEDURE — 250N000011 HC RX IP 250 OP 636: Performed by: INTERNAL MEDICINE

## 2021-10-27 PROCEDURE — 99000 SPECIMEN HANDLING OFFICE-LAB: CPT | Performed by: PATHOLOGY

## 2021-10-27 RX ORDER — POLYMYXIN B SULFATE, BACITRACIN ZINC AND NEOMYCIN SULFATE 400; 3.5; 5 [USP'U]/G; MG/G; [USP'U]/G
1 OINTMENT TOPICAL
Qty: 90 TABLET | Refills: 3 | Status: SHIPPED | OUTPATIENT
Start: 2021-10-27 | End: 2022-03-23

## 2021-10-27 RX ORDER — ALBUTEROL SULFATE 90 UG/1
2 AEROSOL, METERED RESPIRATORY (INHALATION) EVERY 4 HOURS PRN
Qty: 18 G | Refills: 1 | Status: SHIPPED | OUTPATIENT
Start: 2021-10-27

## 2021-10-27 RX ADMIN — INFLUENZA A VIRUS A/GUANGDONG-MAONAN/SWL1536/2019 CNIC-1909 (H1N1) ANTIGEN (FORMALDEHYDE INACTIVATED), INFLUENZA A VIRUS A/HONG KONG/2671/2019 (H3N2) ANTIGEN (FORMALDEHYDE INACTIVATED), INFLUENZA B VIRUS B/PHUKET/3073/2013 ANTIGEN (FORMALDEHYDE INACTIVATED), AND INFLUENZA B VIRUS B/WASHINGTON/02/2019 ANTIGEN (FORMALDEHYDE INACTIVATED) 0.5 ML: 15; 15; 15; 15 INJECTION, SUSPENSION INTRAMUSCULAR at 16:04

## 2021-10-27 NOTE — NURSING NOTE
"Chief Complaint   Patient presents with     RECHECK     B20 follow up       /83   Pulse 83   Temp 98.6  F (37  C)   Wt 95.8 kg (211 lb 4.8 oz)   SpO2 98%   BMI 32.61 kg/m      Injectable Influenza Immunization Documentation    1.  Has the patient received the information for the injectable influenza vaccine? YES     2. Is the patient 6 months of age or older? YES     3. Does the patient have any of the following contraindications?         Severe allergy to eggs? No     Severe allergic reaction to previous influenza vaccines? No   Severe allergy to latex? No       History of Guillain-Grandfalls syndrome? No     Currently have a temperature greater than 100.4F? No        4.  Severely egg allergic patients should have flu vaccine eligibility assessed by an MD, RN, or pharmacist, and those who received flu vaccine should be observed for 15 min by an MD, RN, Pharmacist, Medical Technician, or member of clinic staff.\": YES    5. Latex-allergic patients should be given latex-free influenza vaccine Yes. Please reference the Vaccine latex table to determine if your clinic s product is latex-containing.       Vaccination given by Krissy SRINIVASAN CMA    The following medication was given:     MEDICATION: Flu vaccine [id number] formula  ROUTE: IM  SITE: Deltoid - Left  DOSE: 0.5 mL  LOT #: [id number]  :  [employer]  EXPIRATION DATE:  06/30/2022  NDC#: 31547-718-01         Krissy SRINIVASAN CMA  "

## 2021-10-28 LAB
CD3 CELLS # BLD: 2989 CELLS/UL (ref 603–2990)
CD3 CELLS NFR BLD: 62 % (ref 49–84)
CD3+CD4+ CELLS # BLD: 1006 CELLS/UL (ref 441–2156)
CD3+CD4+ CELLS NFR BLD: 21 % (ref 28–63)
CD3+CD4+ CELLS/CD3+CD8+ CLL BLD: 0.52 % (ref 1.4–2.6)
CD3+CD8+ CELLS # BLD: 1930 CELLS/UL (ref 125–1312)
CD3+CD8+ CELLS NFR BLD: 40 % (ref 10–40)
T CELL COMMENT: ABNORMAL
T PALLIDUM AB SER QL: NONREACTIVE

## 2021-10-29 LAB — HIV1 RNA # PLAS NAA DL=20: NOT DETECTED COPIES/ML

## 2021-11-08 NOTE — PROGRESS NOTES
Division of Infectious Diseases and International Medicine  Department of Medicine    [facility] TELEPHONE VISIT NOTE [place] Mail [address] [place] [address]  Office: [phone]  Fax:  [phone]     HISTORY OF PRESENT ILLNESS:    Mr. Guerin is a nearly-51 year old gentleman with asymptomatic HIV infection  (diagnosed 6/11/10) who returns to [facility] for annual follow-up of continued antiretroviral therapy of Odefsey one tablet PO daily with breakfast  (switched to Odefsey from Atripla ~ 6/25/18; Atripla started in 7/10).  He almost never misses antiretroviral doses and lacks drug side effects.  He reports generally feeling healthy in the past year and free of Covid-19.  He reports he received two Covid-19 vaccinations when he was visiting in [place] this past summer.  He exercises regularly.  He lacks new concerns or complaints today, including no recent febrile symptoms, EENT symptoms, cough, dyspnea, GI or  symptoms, headache or other neurological symptoms.     PAST MEDICAL HISTORY:  Past Medical History:   Diagnosis Date     Childhood asthma     Resolved.     Finger fracture, left ~ 1991.     Human immunodeficiency virus (HIV) disease (H) Diagnosed 6/11/10    CD4+ cell count never below 200, no AIDS sequelae.  Started therapy with Atripla 7/2/10.     Other chronic pain      CURRENT MEDICATIONS:  Current Outpatient Medications   Medication Sig Dispense Refill     albuterol (PROAIR HFA) 108 (90 Base) MCG/ACT inhaler Inhale 2 puffs into the lungs every 4 hours as needed for shortness of breath / dyspnea or wheezing 18 g 1     emtricitabine-rilpivirine-tenofovir (ODEFSEY) 200-25-25 MG TABS per tablet Take 1 tablet by mouth daily with food 90 tablet 3     gabapentin (NEURONTIN) 600 MG tablet TAKE 1 TABLET (600 MG) BY MOUTH THREE TIMES A DAY 90 tablet 0     Multiple Vitamin (ONE-A-DAY MENS) TABS Take  by mouth daily.       oxyCODONE (ROXICODONE) 5 MG tablet  "Take 1 tablet (5 mg) by mouth daily as needed for severe pain 10 tablet 0     SOCIAL HISTORY:  Social History     Socioeconomic History     Marital status: Single     Spouse name: Not on file     Number of children: Not on file     Years of education: Not on file     Highest education level: Not on file   Occupational History     Not on file   Tobacco Use     Smoking status: Current Every Day Smoker     Types: Cigarettes     Smokeless tobacco: Never Used   Substance and Sexual Activity     Alcohol use: Not Currently     Drug use: No     Sexual activity: Not on file   Other Topics Concern     Parent/sibling w/ CABG, MI or angioplasty before 65F 55M? Not Asked   Social History Narrative    Citizen of [place], moved to [place] in early 2010.  Lives in [facility].  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     Social Determinants of Health     Financial Resource Strain: Not on file   Food Insecurity: Not on file   Transportation Needs: Not on file   Physical Activity: Not on file   Stress: Not on file   Social Connections: Not on file   Intimate Partner Violence: Not on file   Housing Stability: Not on file   Sexually active with a female partner (insertive only) and practices safe sex with a condom.  He performs push-ups for exercise each morning and evening.  He now also goes to a gym twice a week and sometimes bikes around [place].  Quit smoking ~ early 2016.    FAMILY HISTORY:  Family History   Problem Relation Age of Onset     Hypertension Other      Glaucoma Mother      REVIEW OF SYMPTOMS:  As per HPI.    PHYSICAL EXAMINATION:  General:  A pleasant, conversant, WDWN 50 year old man in no discomfort.  Vital signs:  /83   Pulse 83   Temp 98.6  F (37  C)   Wt 95.8 kg (211 lb 4.8 oz)   SpO2 98%   BMI 32.61 kg/m   (weight decreased five pounds versus 10/19).  Skin:  No acute rash or lesion.  Head / Neck:  NCAT. External auditory canals are patent " bilaterally without discharge.  PERRL.  EOMI.  Conjunctivae are pink without injection.  Sclerae are white.  Oropharynx lacks erythema, exudate, or lesion.  Dentition is normal.  Neck is supple without thyromegaly or lymphadenopathy.  Lungs:  Bilaterally clear to auscultation.  CV: RRR.  Normal S1, S2 without gallop, murmur or rub.  Abdomen: Bowel sounds present, soft, obese, nontender.  Back:  No low spine or CVA tenderness.  Extremities:  Distally warm, no edema.  Neuro: Alert, oriented, memory/cognition/affect normal.  Gait is normal.    LABORATORY STUDIES (Reviewed with the patient during his appointment today):    Orders Only on 10/12/2020   Component Date Value Ref Range Status     Lipase 10/12/2020 117  73 - 393 U/L Final     Amylase 10/12/2020 80  30 - 110 U/L Final     Cholesterol 10/12/2020 174  <200 mg/dL Final     Triglycerides 10/12/2020 247* <150 mg/dL Final    Comment: Borderline high:  150-199 mg/dl  High:             200-499 mg/dl  Very high:       >499 mg/dl       HDL Cholesterol 10/12/2020 36* >39 mg/dL Final     LDL Cholesterol Calculated 10/12/2020 88  <100 mg/dL Final    Desirable:       <100 mg/dl     Non HDL Cholesterol 10/12/2020 137* <130 mg/dL Final    Comment: Above Desirable:  130-159 mg/dl  Borderline high:  160-189 mg/dl  High:             190-219 mg/dl  Very high:       >219 mg/dl       Treponema Antibodies 10/12/2020 Nonreactive  NR^Nonreactive Final    Comment: Methodology Change: Test performed on the e[employer]ruck Liaison XL by Treponema   pallidum Total Antibodies Assay as of 3.17.2020.       IFC Specimen 10/12/2020 Blood   Final     CD3 Mature T 10/12/2020 60  49 - 84 % Final     CD4 [place] T 10/12/2020 21* 28 - 63 % Final     CD8 Suppressor T 10/12/2020 38  10 - 40 % Final     CD4:CD8 Ratio 10/12/2020 0.55* 1.40 - 2.60 Final     Absolute CD3 10/12/2020 2,661  603 - 2,990 cells/uL Final     Absolute CD4 10/12/2020 943  441 - 2,156 cells/uL Final     Absolute CD8 10/12/2020 1,681* 125  - 1,312 cells/uL Final     HIV-1 RNA Quant Result 10/12/2020 HIV-1 RNA Not Detected  HIVND^HIV-1 RNA Not Detected [Copies]/mL Final    Comment: The ERIKA AmpliPrep/ERIKA TaqMan HIV-1 test is an FDA-approved in vitro   nucleic acid amplification test for the quantitation of HIV-1 RNA in human   plasma (EDTA plasma) using the ERIKA AmpliPrep instrument for automated viral   nucleic acid extraction and the ERIKA TaqMan Analyzer or ERIKA TaqMan for   automated Real Time PCR amplification and detection of the viral nucleic acid   target.  Titer results are reported in copies/ml. This assay is intended for use in   conjunction with clinical presentation and other laboratory markers of disease   prognosis and for use as an aid in assessing viral response to antiretroviral   treatment as measured by changes in plasma HIV-1 RNA levels. This test should   not be used as a donor screening test to confirm the presence of HIV-1   infection.       HIV RNA QT Log 10/12/2020 Not Calculated  <1.3 [Log_copies]/mL Final     WBC 10/12/2020 9.0  4.0 - 11.0 10e9/L Final     RBC Count 10/12/2020 4.84  4.4 - 5.9 10e12/L Final     Hemoglobin 10/12/2020 15.3  13.3 - 17.7 g/dL Final     Hematocrit 10/12/2020 46.3  40.0 - 53.0 % Final     MCV 10/12/2020 96  78 - 100 fl Final     MCH 10/12/2020 31.6  26.5 - 33.0 pg Final     MCHC 10/12/2020 33.0  31.5 - 36.5 g/dL Final     RDW 10/12/2020 12.6  10.0 - 15.0 % Final     Platelet Count 10/12/2020 226  150 - 450 10e9/L Final     Diff Method 10/12/2020 Automated Method   Final     % Neutrophils 10/12/2020 46.9  % Final     % Lymphocytes 10/12/2020 45.8  % Final     % Monocytes 10/12/2020 5.0  % Final     % Eosinophils 10/12/2020 1.7  % Final     % Basophils 10/12/2020 0.4  % Final     % Immature Granulocytes 10/12/2020 0.2  % Final     Nucleated RBCs 10/12/2020 0  0 /100 Final     Absolute Neutrophil 10/12/2020 4.2  1.6 - 8.3 10e9/L Final     Absolute Lymphocytes 10/12/2020 4.1  0.8 - 5.3 10e9/L  Final     Absolute Monocytes 10/12/2020 0.5  0.0 - 1.3 10e9/L Final     Absolute Eosinophils 10/12/2020 0.2  0.0 - 0.7 10e9/L Final     Absolute Basophils 10/12/2020 0.0  0.0 - 0.2 10e9/L Final     Abs Immature Granulocytes 10/12/2020 0.0  0 - 0.4 10e9/L Final     Absolute Nucleated RBC 10/12/2020 0.0   Final     Sodium 10/12/2020 138  133 - 144 mmol/L Final     Potassium 10/12/2020 3.8  3.4 - 5.3 mmol/L Final     Chloride 10/12/2020 106  94 - 109 mmol/L Final     Carbon Dioxide 10/12/2020 27  20 - 32 mmol/L Final     Anion Gap 10/12/2020 5  3 - 14 mmol/L Final     Glucose 10/12/2020 98  70 - 99 mg/dL Final     Urea Nitrogen 10/12/2020 13  7 - 30 mg/dL Final     Creatinine 10/12/2020 0.93  0.66 - 1.25 mg/dL Final     GFR Estimate 10/12/2020 >90  >60 mL/min/[1.73_m2] Final    Comment: Non  GFR Calc  Starting 12/18/2018, serum creatinine based estimated GFR (eGFR) will be   calculated using the Chronic Kidney Disease Epidemiology Collaboration   (CKD-EPI) equation.       GFR Estimate If Black 10/12/2020 >90  >60 mL/min/[1.73_m2] Final    Comment:  GFR Calc  Starting 12/18/2018, serum creatinine based estimated GFR (eGFR) will be   calculated using the Chronic Kidney Disease Epidemiology Collaboration   (CKD-EPI) equation.       Calcium 10/12/2020 9.2  8.5 - 10.1 mg/dL Final     Bilirubin Total 10/12/2020 0.3  0.2 - 1.3 mg/dL Final     Albumin 10/12/2020 4.1  3.4 - 5.0 g/dL Final     Protein Total 10/12/2020 8.1  6.8 - 8.8 g/dL Final     Alkaline Phosphatase 10/12/2020 58  40 - 150 U/L Final     ALT 10/12/2020 39  0 - 70 U/L Final     AST 10/12/2020 24  0 - 45 U/L Final     IMPRESSION AND PLAN:  1. Stable, asymptomatic HIV infection:  He remains on Odefsey with tight dosing adherence and excellent medication tolerance and with a normal absolute CD4+ cell count and undetectable HIV plasma viral load as of a year ago.  We will repeat HIV and drug toxicity laboratory studies today.  If  those remain good, he will continue taking Odefsey and return to [facility] for follow-up in a year, or as needed before then.  2. Stable obesity:  His BMI remains above thirty, although he has not gained weight in the past two years (and has actually lost about five pounds in the past several months).  Encouraged ongoing attention to diet and applauded his regular exercising.  3. History of treated past recurrent urinary Chlamydia infections:  No current symptoms or recent exposure.  In the past, he has been treated with azithromycin / IM ceftriaxone here on 6/22/15 and 10/3/17 (with a positive 9/20/17 urine Chlamydia screen).  He had some mild ureteral discharge symptoms and a possible exposure in early 4/19, so was again presumptively treated with azithromycin / ceftriaxone once again then, but screens since 4/10/19 have been negative.  He has never tested positive for gonorrhea or syphilis at [facility] and has denied potential exposures in recent years.  4. Successful tobacco cessation:  Quit in ~ 3/19.  5. Occasionally mildly symptomatic asthma:  Renewed albuterol prn rescue inhaler today -- he does not use it often.  6. Health Care Maintenance:   He says he received two Covid-19 vaccinations when he was visiting in [place] this past summer.  Influenza vaccine given today.  Menactra vaccine given 11/25/15.  Received Tdap 2/12/14.  Received a Prevnar 13 vaccine 2/12/14 and Pneumovax on 8/13/14.  HAV / HBV sero-immune on 6/18/15 post-vaccination screening.  HCV seronegative 6/28/11 and 5/16/16.  3/21/12 Quantiferon Gold assay negative.  Routine antitreponemal antibody screen negative most recently on 10/12/20.  Non-fasting lipids were good on 10/12/20 (total cholesterol 174, LDL 88, HDL 36) although the post-prandial noon-hour triglycerides assay was newly mildly elevated at 247 -- will repeat lipid panel.  Creatinine and blood pressure remain normal.  Urinalysis was benign on 7/23/19.  Underwent a right  lower molar dental extraction in ~ 4/15; up to date with a dentist.

## 2022-01-11 ENCOUNTER — TELEPHONE (OUTPATIENT)
Dept: ANESTHESIOLOGY | Facility: CLINIC | Age: 52
End: 2022-01-11
Payer: COMMERCIAL

## 2022-01-11 DIAGNOSIS — Z11.59 ENCOUNTER FOR SCREENING FOR OTHER VIRAL DISEASES: Primary | ICD-10-CM

## 2022-01-11 DIAGNOSIS — M54.12 CERVICAL RADICULOPATHY: Primary | ICD-10-CM

## 2022-01-11 NOTE — TELEPHONE ENCOUNTER
Pt called and stated that his pain is coming back and looking to do another injection. Sent to Dr. Schwab for new order req.

## 2022-01-13 ENCOUNTER — OFFICE VISIT (OUTPATIENT)
Dept: OPHTHALMOLOGY | Facility: CLINIC | Age: 52
End: 2022-01-13
Attending: OPHTHALMOLOGY
Payer: COMMERCIAL

## 2022-01-13 DIAGNOSIS — Z21 HUMAN IMMUNODEFICIENCY VIRUS I INFECTION (H): ICD-10-CM

## 2022-01-13 DIAGNOSIS — H52.4 PRESBYOPIA: Primary | ICD-10-CM

## 2022-01-13 DIAGNOSIS — Z13.5 SCREENING FOR EYE CONDITION: ICD-10-CM

## 2022-01-13 DIAGNOSIS — H11.131 CONJUNCTIVAL PIGMENTATION, RIGHT: ICD-10-CM

## 2022-01-13 PROCEDURE — G0463 HOSPITAL OUTPT CLINIC VISIT: HCPCS

## 2022-01-13 PROCEDURE — 92004 COMPRE OPH EXAM NEW PT 1/>: CPT | Performed by: OPHTHALMOLOGY

## 2022-01-13 ASSESSMENT — VISUAL ACUITY
OS_SC: 20/20
OD_PH_SC: 20/20
METHOD: SNELLEN - LINEAR
METHOD_MR_RETINOSCOPY: 1
OD_SC: 20/30
OD_SC+: -1
OS_SC+: -1
OD_PH_SC+: -1

## 2022-01-13 ASSESSMENT — CUP TO DISC RATIO
OD_RATIO: 0.45
OS_RATIO: 0.4

## 2022-01-13 ASSESSMENT — REFRACTION_MANIFEST
OS_AXIS: 040
OS_SPHERE: +0.50
OD_AXIS: 150
OS_CYLINDER: +0.50
OD_SPHERE: +0.25
OD_ADD: +1.25
OD_CYLINDER: +0.25
OS_ADD: +1.25

## 2022-01-13 ASSESSMENT — SLIT LAMP EXAM - LIDS
COMMENTS: NORMAL
COMMENTS: NORMAL

## 2022-01-13 ASSESSMENT — CONF VISUAL FIELD
OS_NORMAL: 1
OD_NORMAL: 1
METHOD: COUNTING FINGERS

## 2022-01-13 ASSESSMENT — TONOMETRY
OD_IOP_MMHG: 14
IOP_METHOD: TONOPEN
OS_IOP_MMHG: 15

## 2022-01-13 ASSESSMENT — EXTERNAL EXAM - RIGHT EYE: OD_EXAM: NORMAL

## 2022-01-13 ASSESSMENT — EXTERNAL EXAM - LEFT EYE: OS_EXAM: NORMAL

## 2022-01-13 NOTE — NURSING NOTE
Chief Complaints and History of Present Illnesses   Patient presents with     Annual Eye Exam     Chief Complaint(s) and History of Present Illness(es)     Annual Eye Exam     Laterality: both eyes    Course: stable    Associated symptoms: Negative for floaters, flashes, headache and eye pain    Treatments tried: no treatments    Pain scale: 0/10              Comments     Pt C/o trouble with near vision, does not have reading glasses  Pt states no flashes, floaters, eye pain or headaches    Ashia Cao COT 12:49 PM January 13, 2022

## 2022-01-13 NOTE — PATIENT INSTRUCTIONS
Can try Over the counter reading glasses: +1.50 D    [facility]  [address] [place] [id number]

## 2022-01-17 ENCOUNTER — LAB (OUTPATIENT)
Dept: LAB | Facility: CLINIC | Age: 52
End: 2022-01-17
Payer: COMMERCIAL

## 2022-01-17 DIAGNOSIS — Z11.59 ENCOUNTER FOR SCREENING FOR OTHER VIRAL DISEASES: ICD-10-CM

## 2022-01-17 PROCEDURE — U0005 INFEC AGEN DETEC AMPLI PROBE: HCPCS | Mod: 90 | Performed by: PATHOLOGY

## 2022-01-17 PROCEDURE — 99000 SPECIMEN HANDLING OFFICE-LAB: CPT | Performed by: PATHOLOGY

## 2022-01-17 PROCEDURE — U0003 INFECTIOUS AGENT DETECTION BY NUCLEIC ACID (DNA OR RNA); SEVERE ACUTE RESPIRATORY SYNDROME CORONAVIRUS 2 (SARS-COV-2) (CORONAVIRUS DISEASE [COVID-19]), AMPLIFIED PROBE TECHNIQUE, MAKING USE OF HIGH THROUGHPUT TECHNOLOGIES AS DESCRIBED BY CMS-2020-01-R: HCPCS | Mod: 90 | Performed by: PATHOLOGY

## 2022-01-18 LAB — SARS-COV-2 RNA RESP QL NAA+PROBE: NEGATIVE

## 2022-01-20 ENCOUNTER — ANCILLARY PROCEDURE (OUTPATIENT)
Dept: RADIOLOGY | Facility: AMBULATORY SURGERY CENTER | Age: 52
End: 2022-01-20
Attending: ANESTHESIOLOGY
Payer: COMMERCIAL

## 2022-01-20 ENCOUNTER — HOSPITAL ENCOUNTER (OUTPATIENT)
Facility: AMBULATORY SURGERY CENTER | Age: 52
Discharge: HOME | End: 2022-01-20
Attending: ANESTHESIOLOGY | Admitting: ANESTHESIOLOGY
Payer: COMMERCIAL

## 2022-01-20 VITALS
WEIGHT: 211 LBS | TEMPERATURE: 97.1 F | DIASTOLIC BLOOD PRESSURE: 77 MMHG | HEART RATE: 104 BPM | RESPIRATION RATE: 16 BRPM | OXYGEN SATURATION: 100 % | BODY MASS INDEX: 31.98 KG/M2 | SYSTOLIC BLOOD PRESSURE: 122 MMHG | HEIGHT: 68 IN

## 2022-01-20 DIAGNOSIS — M54.12 CERVICAL RADICULOPATHY: Primary | ICD-10-CM

## 2022-01-20 DIAGNOSIS — M48.02 NEUROFORAMINAL STENOSIS OF CERVICAL SPINE: ICD-10-CM

## 2022-01-20 PROCEDURE — 62321 NJX INTERLAMINAR CRV/THRC: CPT | Performed by: ANESTHESIOLOGY

## 2022-01-20 PROCEDURE — 62321 NJX INTERLAMINAR CRV/THRC: CPT

## 2022-01-20 RX ORDER — LIDOCAINE HYDROCHLORIDE 10 MG/ML
INJECTION, SOLUTION EPIDURAL; INFILTRATION; INTRACAUDAL; PERINEURAL PRN
Status: DISCONTINUED | OUTPATIENT
Start: 2022-01-20 | End: 2022-01-20 | Stop reason: HOSPADM

## 2022-01-20 RX ORDER — IOPAMIDOL 408 MG/ML
INJECTION, SOLUTION INTRATHECAL PRN
Status: DISCONTINUED | OUTPATIENT
Start: 2022-01-20 | End: 2022-01-20 | Stop reason: HOSPADM

## 2022-01-20 RX ORDER — DEXAMETHASONE SODIUM PHOSPHATE 10 MG/ML
INJECTION INTRAMUSCULAR; INTRAVENOUS PRN
Status: DISCONTINUED | OUTPATIENT
Start: 2022-01-20 | End: 2022-01-20 | Stop reason: HOSPADM

## 2022-01-20 RX ORDER — BUPIVACAINE HYDROCHLORIDE 2.5 MG/ML
INJECTION, SOLUTION EPIDURAL; INFILTRATION; INTRACAUDAL PRN
Status: DISCONTINUED | OUTPATIENT
Start: 2022-01-20 | End: 2022-01-20 | Stop reason: HOSPADM

## 2022-01-20 ASSESSMENT — MIFFLIN-ST. JEOR: SCORE: 1783.65

## 2022-01-20 NOTE — H&P
Viral Guerin  [id number]  male  51 year old      Reason for procedure/surgery: neck pain    Patient Active Problem List   Diagnosis     Tobacco user     Human immunodeficiency virus I infection (H)     Neuroforaminal stenosis of cervical spine     Cervical radiculopathy at C6     Cervical arteriopathy,  (H)       Past Surgical History:    Past Surgical History:   Procedure Laterality Date     INJECT EPIDURAL CERVICAL N/A 2021    Procedure: Interlaminar cervical epidural steroid injection;  Surgeon: Lanny Schwab MD;  Location: [facility] OR     INJECT EPIDURAL CERVICAL N/A 2021    Procedure: INJECTION, SPINE, CERVICAL, EPIDURAL;  Surgeon: Lanny Schwab MD;  Location: [facility] OR       Past Medical History:   Past Medical History:   Diagnosis Date     Childhood asthma     Resolved.     Finger fracture, left ~ .     Human immunodeficiency virus (HIV) disease (H) Diagnosed 6/11/10    CD4+ cell count never below 200, no AIDS sequelae.  Started therapy with Atripla 7/2/10.     Other chronic pain        Social History:   Social History     Tobacco Use     Smoking status: Current Every Day Smoker     Types: Cigarettes     Smokeless tobacco: Never Used   Substance Use Topics     Alcohol use: Not Currently       Family History:   Family History   Problem Relation Age of Onset     Hypertension Other      Glaucoma Mother        Allergies:   Allergies   Allergen Reactions     Shellfish-Derived Products Swelling     Mouth and tongue will swell up even touching       Active Medications:   Current Outpatient Medications   Medication Sig Dispense Refill     albuterol (PROAIR HFA) 108 (90 Base) MCG/ACT inhaler Inhale 2 puffs into the lungs every 4 hours as needed for shortness of breath / dyspnea or wheezing 18 g 1     emtricitabine-rilpivirine-tenofovir (ODEFSEY) 200-25-25 MG TABS per tablet Take 1 tablet by mouth daily with food 90 tablet 3     gabapentin (NEURONTIN) 600 MG tablet TAKE 1 TABLET (600 MG) BY  MOUTH THREE TIMES A DAY 90 tablet 0     Multiple Vitamin (ONE-A-DAY MENS) TABS Take  by mouth daily.         Systemic Review:   CONSTITUTIONAL: NEGATIVE for fever, chills, change in weight  ENT/MOUTH: NEGATIVE for ear, mouth and throat problems  RESP: NEGATIVE for significant cough or SOB  CV: NEGATIVE for chest pain, palpitations or peripheral edema    Physical Examination:   Vital Signs: There were no vitals taken for this visit.  GENERAL: healthy, alert and no distress  NECK: no adenopathy, no asymmetry, masses, or scars  RESP: lungs clear to auscultation - no rales, rhonchi or wheezes  CV: regular rate and rhythm, normal S1 S2, no S3 or S4, no murmur, click or rub, no peripheral edema and peripheral pulses strong  ABDOMEN: soft, nontender, no hepatosplenomegaly, no masses and bowel sounds normal  MS: no gross musculoskeletal defects noted, no edema    Plan: Appropriate to proceed as scheduled.      Mustapha Maurer MD  Pain Medicine Fellow  [facility]  1/19/2022

## 2022-01-20 NOTE — DISCHARGE INSTRUCTIONS
Home Care Instructions after an Epidural Steroid Pain Injection    A lumbar epidural steroid injection delivers steroid medication directly into the area that may be causing your lower back pain and/or leg pain. A cervical or thoracic epidural steroid injection delivers steroids into the epidural space surrounding spinal nerve roots to help relieve pain in the upper spine/neck.    Activity  -Rest today  -Do not work today  -Resume normal activity tomorrow  -DO NOT shower for 24 hours  -DO NOT remove bandaid for 24 hours    Pain  -You may experience soreness at the injection site for one or two days  -You may use an ice pack for 20 minutes every 2 hours for the first 24 hours  -You may use a heating pad after the first 24 hours  -You may use Tylenol (acetaminophen) every 4 hours or other pain medicines as     directed by your physician    You may experience numbness radiating into your legs or arms (depending on the procedure location). This numbness may last several hours. Until sensation returns to normal; please use caution in walking, climbing stairs, and stepping out of your vehicle, etc.    Common side effects of steroids:  Not everyone will experience corticosteroid side effects. If side effects are experienced, they will gradually subside in the 7-10 day period following an injection. Most common side effects include:  -Flushed face and/or chest  -Feeling of warmth, particularly in the face but could be an overall feeling of warmth  -Increased blood sugar in diabetic patients  -Menstrual irregularities my occur. If taking hormone-based birth control an alternate method of birth control is recommended  -Sleep disturbances and/or mood swings are possible  -Leg cramps    Please contact us if you have:  -Severe pain  -Fever more than 101.5 degrees Fahrenheit  -Signs of infection at the injection site (redness, swelling, or drainage)    If you have questions, please contact our office at [phone] between the  hours of 7:00 am and 3:00 pm Monday through Friday. After office hours you can contact the on call provider by dialing [phone]. If you need immediate attention, we recommend that you go to a hospital emergency room or dial [phone].

## 2022-01-20 NOTE — OP NOTE
Patient: Viral Guerin     MRN: [id number]           PAIN MEDICINE CLINIC PROCEDURE NOTE     ATTENDING CLINICIAN:    Lanny Schwab MD    Fellow:   Aura Barraza MD, SEEMA     PREPROCEDURE DIAGNOSES:  1.  Cervical radiculopathy  2.  Chronic neck pain radiating to the upper extremity        PROCEDURE(S) PERFORMED:  1.  Interlaminar cervical epidural steroid injection (C7-T1 approach)  2.  Fluoroscopic guidance for the above-named procedure(s)        ANESTHESIA:  Local.     BLOOD LOSS:  Minimal.     DRAINS AND SPECIMENS:  None.     COMPLICATIONS:  None.     INDICATIONS:  Viral Guerin is a 51 year old male with a history of  chronic neck pain radiating to the upper extremity.  The patient stated that the patient was in their usual state of health and denied recent anticoagulant use or recent infections.  Therefore, the plan is for the above mentioned procedure.      Procedure Details:  The patient was met in the procedure room, where the patient was identified by name, medical record number and date of birth.  All of the patient s last minute questions were answered. Written informed consent was obtained and saved in the electronic medical record, after the risks, benefits, and alternatives were discussed with the patient.       A formal time-out procedure was performed, as per protocol, including patient name, title of procedure, and site of procedure, and all in the room concurred.  Routine monitors were applied.       The patient was placed in the prone position on the procedure room table.  All pressure points were checked and comfortably padded.  Routine monitors were placed.  Vital signs were stable.     A chlorhexidine prep was completed followed by sterile draping per standard procedure.      The AP fluoroscopic view was optimized for approach at  C7-T1 interspace.  The skin over the interspace was infiltrated with 4-5 mL of 1% Lidocaine using a 25 gauge, 1.5 inch needle.  A 18 -gauge 3-1/2 inch Tuohy needle was  advanced midline between C7-T1 interlaminar space using the loss of resistance technique with preservative free normal saline with fluoroscopic guidance. Mutiple AP, contralateral oblique, and lateral fluoroscopic images are taken as Tuohy needle was advanced to the epidural space. The epidural space was identified, without evidence of blood, cerebrospinal fluid, or parasthesia throughout. Needle tip placement within the epidural space was further confirmed with 1-2 mL of nonionic contrast agent, with the epidural space visualized in the AP, contralateral oblique, and lateral fluoroscopic view(s) with appropriate spread of the agent with fat vacuolization and no intravascular or intrathecal spread noted.  Next, 6 mL of a treatment solution containing 1 mL of preservative dexamethasone 10mg/ml, 1 mL 0.25% and 4 mL preservative free normal saline was administered. The needle was withdrawn.     Light pressure was held at the puncture site(s) to prevent ecchymosis and oozing.  The patient's skin was cleansed, and hemostasis was confirmed.  Band-aids were applied to the needle injection site(s).       Condition:     The patient remained awake and alert throughout the procedure.  The patient tolerated the procedure well and was monitored for approximately 15 minutes afterward in the post procedure area.  There were no immediate post procedure complications noted.  The patient was then discharged to home as per protocol.

## 2022-01-25 ENCOUNTER — THERAPY VISIT (OUTPATIENT)
Dept: PHYSICAL THERAPY | Facility: CLINIC | Age: 52
End: 2022-01-25
Payer: COMMERCIAL

## 2022-01-25 DIAGNOSIS — M48.02 NEUROFORAMINAL STENOSIS OF CERVICAL SPINE: ICD-10-CM

## 2022-01-25 DIAGNOSIS — M54.12 CERVICAL RADICULOPATHY: Primary | ICD-10-CM

## 2022-01-25 PROCEDURE — 97140 MANUAL THERAPY 1/> REGIONS: CPT | Mod: GP | Performed by: PHYSICAL THERAPIST

## 2022-01-25 PROCEDURE — 97161 PT EVAL LOW COMPLEX 20 MIN: CPT | Mod: GP | Performed by: PHYSICAL THERAPIST

## 2022-01-25 NOTE — PROGRESS NOTES
Physical Therapy Initial Evaluation  Subjective:  The history is provided by the patient. No  was used.   Therapist Generated HPI Evaluation  Problem details: DOI: August 15, 2021  Injection: 2021 3injections neck and mostly 4 days ago . Slight decrease in pain with most recent injection, .         Type of problem:  Cervical spine.    This is a chronic condition.  Condition occurred with:  Insidious onset.  Where condition occurred: for unknown reasons.  Site of Pain:  left shoulder blade, 8/10   Pain is described as burning and is constant.  Radiates to: Left lateral upper arm and proximal lateral forearm. Pain timing: none.  Progression since onset: temporary improvement with medication   Associated symptoms:  Numbness (numbness: Left thumb( mostly) 2-5). Symptoms are exacerbated by lying down  and relieved by NSAID's.  Imaging testing: Results unknown for MRI.  Past treatment: none. Improved with treatment: na.  Work activity restrictions: not working.  Barriers include:  None as reported by patient.                        Objective:  System              Cervical/Thoracic Evaluation    AROM:  AROM Cervical:    Flexion:            Wnl  Extension:       90%  Rotation:         Left: 85%     Right: 85%  Side Bend:      Left: 25%     Right:  25%      Headaches: none  Cervical Myotomes:    C1-2 (Neck Flex): Left:  5    Right: 5  C3 (neck side bend): Left: 5    Right: 5  C4 (shrug):  Left: 1+    Right: 5  C5 (Deltoid):  Left: 5    Right: 5  C6 (Biceps):  Left: 4+    Right: 5  C7 (Triceps):  Right: 5  C8 (Thumb Ext): Left: 4+    Right: 5  T1 (Intrinsics): Left: 5    Right: 5  DTR's:    C5 (Biceps):  Left:  3  Right:  3     C6 (Brachioradialis):  Left:  3  Right:  3     C7 (Triceps):  Left:  2  Right:  2    Cervical Dermatomes:            C5 left:  Normal-light touch     C5 right:  Normal-light touch    C6 left:  Normal-light touch     C6 right:  Normal-light touch    C7 left:  Normal-light touch      C7 right:  Normal-light touch          Functional Tests:  not assessed    Cervical Stability/Joint Clearing:  not assessed        Cord Sign:  not assessed                                        No pain noted when applying manual cervical traction and no changes present during traction: scapular retraction and depression increases left lateral shoulder pain  mmt: middle trapezius: 4/5 (R), 3+/5 (L), rhomboids: 4/5 (L), 4+/5 (B), moderate right greater than left upper trapezius, levator scapula and sternocliedomastorid muscles with palpation, no pain or increase in left UE symptoms with cervical retraction with one pillow   General     ROS    Assessment/Plan:    Patient is a 51 year old male with cervical complaints.    Patient has the following significant findings with corresponding treatment plan.                Diagnosis 1:  Cervical radiculopathy  Pain -  hot/cold therapy, mechanical traction, self management, education, directional preference exercise and home program  Decreased ROM/flexibility - manual therapy, therapeutic exercise, therapeutic activity and home program  Decreased strength - therapeutic exercise, therapeutic activities and home program  Impaired muscle performance - neuro re-education and home program  Decreased function - therapeutic activities and home program    Therapy Evaluation Codes:   1) Clinical presentation characteristics are:   Stable/Uncomplicated.  2) Decision-Making    Low complexity using standardized patient assessment instrument and/or measureable assessment of functional outcome.  Cumulative Therapy Evaluation is: Low complexity.    Previous and current functional limitations:  (See Goal Flow Sheet for this information)    Short term and Long term goals: (See Goal Flow Sheet for this information)     Communication ability:  Patient appears to be able to clearly communicate and understand verbal and written communication and follow directions correctly.  Treatment Explanation  - The following has been discussed with the patient:   RX ordered/plan of care  Anticipated outcomes  Possible risks and side effects  This patient would benefit from PT intervention to resume normal activities.   Rehab potential is good.    Frequency:  1 X week, once daily  Duration:  for 6 weeks  Discharge Plan:  Achieve all LTG.  Independent in home treatment program.  Reach maximal therapeutic benefit.    Please refer to the daily flowsheet for treatment today, total treatment time and time spent performing 1:1 timed codes.

## 2022-01-25 NOTE — PROGRESS NOTES
[facility]    OUTPATIENT Physical Therapy ORTHOPEDIC EVALUATION  PLAN OF TREATMENT FOR OUTPATIENT REHABILITATION  (COMPLETE FOR INITIAL CLAIMS ONLY)  Patient's Last Name, First Name, M.I.  YOB: 1970  Viral Guerin    Provider s Name:  [facility]   Medical Record No.  [id number]   Start of Care Date:  01/25/22   Onset Date:   08/15/21   Type:     _X__PT   ___OT Medical Diagnosis:  No diagnosis found.     Treatment Diagnosis:  (P) cervical radiculopathy        Goals:     01/25/22 0500   Treating Provider   Treating Provider Garland PT   Contact Information   Procedure Code: The timed procedures billed today were performed sequentially within this encounter. Manual Therapy   Minutes: Manual therapy 8   Rxs Authorized 6   Rxs Used 1   Diagnosis cervical radiculopathy   Diagnosis 2   (Neuroforaminal stenosis of cervical spine )   Insurance Medicare   Total Treatment Time (minutes) 40   Timed Code Treatment Minutes 8   SOC Date 01/25/22   Beginning of Cert date period 01/25/22   End of Cert period date 03/16/22   Therapy Frequency 1 x/week   Predicted Duration of Therapy Intervention (days/wks) 6 weeks   DOI 08/15/21   Date SOAP completed 01/25/22   Subjective See ie   Initial Pain level 8/10   Other pertinent information Goak:lifting   PTRX Therapeutic Exercise   Therapeutic Exercise 1 Supine Cervical Retraction   Therapeutic Exercise Notes 1  HEP - Sets: 1 Reps: 10 with 5 second hold with one pillow every 2-3 hours No Notes       Therapy Frequency:  1 x/week  Predicted Duration of Therapy Intervention:  6 weeks    Ludmila Haque, PT                 I CERTIFY THE NEED FOR THESE SERVICES FURNISHED UNDER        THIS PLAN OF TREATMENT AND WHILE UNDER MY CARE     (Physician attestation of this document indicates review and certification of the therapy plan).                        Certification Date From:  01/25/22   Certification Date To:  03/16/22    Referring Provider:  Lanny Schwab    Initial Assessment        See Epic Evaluation SOC Date: 01/25/22

## 2022-01-26 NOTE — PROGRESS NOTES
Physical Therapy Initial Evaluation  Subjective:    Patient Health History         Pain is reported as 8/10 on pain scale.  General health as reported by patient is fair.                  Current occupation is Unemployed.                                       Objective:  System    Physical Exam    General     ROS    Assessment/Plan:

## 2022-02-04 ENCOUNTER — THERAPY VISIT (OUTPATIENT)
Dept: PHYSICAL THERAPY | Facility: CLINIC | Age: 52
End: 2022-02-04
Payer: COMMERCIAL

## 2022-02-04 DIAGNOSIS — M54.12 CERVICAL RADICULOPATHY: ICD-10-CM

## 2022-02-04 DIAGNOSIS — M48.02 NEUROFORAMINAL STENOSIS OF CERVICAL SPINE: ICD-10-CM

## 2022-02-04 PROCEDURE — 97110 THERAPEUTIC EXERCISES: CPT | Mod: GP | Performed by: PHYSICAL THERAPIST

## 2022-02-04 PROCEDURE — 97530 THERAPEUTIC ACTIVITIES: CPT | Mod: GP | Performed by: PHYSICAL THERAPIST

## 2022-02-04 PROCEDURE — 97012 MECHANICAL TRACTION THERAPY: CPT | Mod: GP | Performed by: PHYSICAL THERAPIST

## 2022-02-08 ENCOUNTER — THERAPY VISIT (OUTPATIENT)
Dept: PHYSICAL THERAPY | Facility: CLINIC | Age: 52
End: 2022-02-08
Payer: COMMERCIAL

## 2022-02-08 DIAGNOSIS — M54.12 CERVICAL RADICULOPATHY: Primary | ICD-10-CM

## 2022-02-08 DIAGNOSIS — M48.02 NEUROFORAMINAL STENOSIS OF CERVICAL SPINE: ICD-10-CM

## 2022-02-08 PROCEDURE — 97012 MECHANICAL TRACTION THERAPY: CPT | Mod: GP | Performed by: PHYSICAL THERAPIST

## 2022-02-08 PROCEDURE — 97530 THERAPEUTIC ACTIVITIES: CPT | Mod: GP | Performed by: PHYSICAL THERAPIST

## 2022-02-08 PROCEDURE — 97140 MANUAL THERAPY 1/> REGIONS: CPT | Mod: GP | Performed by: PHYSICAL THERAPIST

## 2022-02-18 ENCOUNTER — TELEPHONE (OUTPATIENT)
Dept: ANESTHESIOLOGY | Facility: CLINIC | Age: 52
End: 2022-02-18

## 2022-02-18 DIAGNOSIS — Z11.59 ENCOUNTER FOR SCREENING FOR OTHER VIRAL DISEASES: Primary | ICD-10-CM

## 2022-02-18 DIAGNOSIS — M54.12 CERVICAL RADICULOPATHY: Primary | ICD-10-CM

## 2022-02-18 RX ORDER — DIAZEPAM 5 MG
5 TABLET ORAL ONCE
Status: CANCELLED | OUTPATIENT
Start: 2022-02-18 | End: 2022-02-18

## 2022-02-18 NOTE — TELEPHONE ENCOUNTER
Patient is scheduled for procedure with Dr. Schwab    Spoke with: Patient    Date of Procedure: 02-24-22    Location: [facility] [place]    Informed patient they will need an adult  Yes    Pre-procedure COVID-19 Test: 02-21-22    Additional comments: N/A    Patient is aware pre-op RN will call 2-3 days prior to procedure with arrival time and instructions. Yes      Ame Romero on 2/18/2022 at 11:19 AM

## 2022-02-18 NOTE — TELEPHONE ENCOUNTER
Pt called and needed another procedure since he is in a lot of pain, sent to Dr. Schwab for new request

## 2022-02-21 ENCOUNTER — LAB (OUTPATIENT)
Dept: LAB | Facility: CLINIC | Age: 52
End: 2022-02-21
Payer: COMMERCIAL

## 2022-02-21 DIAGNOSIS — Z11.59 ENCOUNTER FOR SCREENING FOR OTHER VIRAL DISEASES: ICD-10-CM

## 2022-02-21 LAB — SARS-COV-2 RNA RESP QL NAA+PROBE: NEGATIVE

## 2022-02-21 PROCEDURE — 99000 SPECIMEN HANDLING OFFICE-LAB: CPT | Performed by: PATHOLOGY

## 2022-02-21 PROCEDURE — U0005 INFEC AGEN DETEC AMPLI PROBE: HCPCS | Mod: 90 | Performed by: PATHOLOGY

## 2022-02-21 PROCEDURE — U0003 INFECTIOUS AGENT DETECTION BY NUCLEIC ACID (DNA OR RNA); SEVERE ACUTE RESPIRATORY SYNDROME CORONAVIRUS 2 (SARS-COV-2) (CORONAVIRUS DISEASE [COVID-19]), AMPLIFIED PROBE TECHNIQUE, MAKING USE OF HIGH THROUGHPUT TECHNOLOGIES AS DESCRIBED BY CMS-2020-01-R: HCPCS | Mod: 90 | Performed by: PATHOLOGY

## 2022-02-24 ENCOUNTER — ANCILLARY PROCEDURE (OUTPATIENT)
Dept: RADIOLOGY | Facility: AMBULATORY SURGERY CENTER | Age: 52
End: 2022-02-24
Attending: ANESTHESIOLOGY
Payer: COMMERCIAL

## 2022-02-24 ENCOUNTER — HOSPITAL ENCOUNTER (OUTPATIENT)
Facility: AMBULATORY SURGERY CENTER | Age: 52
Discharge: HOME | End: 2022-02-24
Attending: ANESTHESIOLOGY | Admitting: ANESTHESIOLOGY
Payer: COMMERCIAL

## 2022-02-24 VITALS
RESPIRATION RATE: 16 BRPM | SYSTOLIC BLOOD PRESSURE: 135 MMHG | TEMPERATURE: 97.3 F | BODY MASS INDEX: 31.07 KG/M2 | WEIGHT: 205 LBS | OXYGEN SATURATION: 97 % | DIASTOLIC BLOOD PRESSURE: 87 MMHG | HEART RATE: 89 BPM | HEIGHT: 68 IN

## 2022-02-24 DIAGNOSIS — M54.2 ACUTE NECK PAIN: ICD-10-CM

## 2022-02-24 DIAGNOSIS — R52 PAIN: ICD-10-CM

## 2022-02-24 DIAGNOSIS — M48.02 NEUROFORAMINAL STENOSIS OF CERVICAL SPINE: ICD-10-CM

## 2022-02-24 DIAGNOSIS — M54.12 CERVICAL RADICULOPATHY: ICD-10-CM

## 2022-02-24 PROCEDURE — 62321 NJX INTERLAMINAR CRV/THRC: CPT

## 2022-02-24 PROCEDURE — 62321 NJX INTERLAMINAR CRV/THRC: CPT | Mod: GC | Performed by: ANESTHESIOLOGY

## 2022-02-24 RX ORDER — BUPIVACAINE HYDROCHLORIDE 2.5 MG/ML
INJECTION, SOLUTION EPIDURAL; INFILTRATION; INTRACAUDAL PRN
Status: DISCONTINUED | OUTPATIENT
Start: 2022-02-24 | End: 2022-02-24 | Stop reason: HOSPADM

## 2022-02-24 RX ORDER — DEXAMETHASONE SODIUM PHOSPHATE 10 MG/ML
INJECTION INTRAMUSCULAR; INTRAVENOUS PRN
Status: DISCONTINUED | OUTPATIENT
Start: 2022-02-24 | End: 2022-02-24 | Stop reason: HOSPADM

## 2022-02-24 RX ORDER — DIAZEPAM 5 MG
5 TABLET ORAL ONCE
Status: COMPLETED | OUTPATIENT
Start: 2022-02-24 | End: 2022-02-24

## 2022-02-24 RX ORDER — LIDOCAINE HYDROCHLORIDE 10 MG/ML
INJECTION, SOLUTION EPIDURAL; INFILTRATION; INTRACAUDAL; PERINEURAL PRN
Status: DISCONTINUED | OUTPATIENT
Start: 2022-02-24 | End: 2022-02-24 | Stop reason: HOSPADM

## 2022-02-24 RX ORDER — IOPAMIDOL 408 MG/ML
INJECTION, SOLUTION INTRATHECAL PRN
Status: DISCONTINUED | OUTPATIENT
Start: 2022-02-24 | End: 2022-02-24 | Stop reason: HOSPADM

## 2022-02-24 RX ORDER — GABAPENTIN 600 MG/1
600 TABLET ORAL 3 TIMES DAILY
Qty: 90 TABLET | Refills: 0 | Status: SHIPPED | OUTPATIENT
Start: 2022-02-24 | End: 2022-03-23

## 2022-02-24 RX ADMIN — DIAZEPAM 5 MG: 5 TABLET ORAL at 13:58

## 2022-02-24 NOTE — DISCHARGE INSTRUCTIONS
Home Care Instructions after an Epidural Steroid Pain Injection    A lumbar epidural steroid injection delivers steroid medication directly into the area that may be causing your lower back pain and/or leg pain. A cervical or thoracic epidural steroid injection delivers steroids into the epidural space surrounding spinal nerve roots to help relieve pain in the upper spine/neck.    Activity  -Rest today  -Do not work today  -Resume normal activity tomorrow  -DO NOT shower for 24 hours  -DO NOT remove bandaid for 24 hours    Pain  -You may experience soreness at the injection site for one or two days  -You may use an ice pack for 20 minutes every 2 hours for the first 24 hours  -You may use a heating pad after the first 24 hours  -You may use Tylenol (acetaminophen) every 4 hours or other pain medicines as     directed by your physician    You may experience numbness radiating into your legs or arms (depending on the procedure location). This numbness may last several hours. Until sensation returns to normal; please use caution in walking, climbing stairs, and stepping out of your vehicle, etc.    Common side effects of steroids:  Not everyone will experience corticosteroid side effects. If side effects are experienced, they will gradually subside in the 7-10 day period following an injection. Most common side effects include:  -Flushed face and/or chest  -Feeling of warmth, particularly in the face but could be an overall feeling of warmth  -Increased blood sugar in diabetic patients  -Menstrual irregularities my occur. If taking hormone-based birth control an alternate method of birth control is recommended  -Sleep disturbances and/or mood swings are possible  -Leg cramps    Please contact us if you have:  -Severe pain  -Fever more than 101.5 degrees Fahrenheit  -Signs of infection at the injection site (redness, swelling, or drainage)    If you have questions, please contact our office at [phone] between the  hours of 7:00 am and 3:00 pm Monday through Friday. After office hours you can contact the on call provider by dialing [phone]. If you need immediate attention, we recommend that you go to a hospital emergency room or dial [phone].

## 2022-02-25 ENCOUNTER — TELEPHONE (OUTPATIENT)
Dept: ANESTHESIOLOGY | Facility: CLINIC | Age: 52
End: 2022-02-25
Payer: COMMERCIAL

## 2022-02-25 NOTE — OP NOTE
Patient: Viral Guerin Age: 51 year old   MRN: [id number] Attending: Dr. Schwab     Date of Visit: February 24, 2022      [facility] PROCEDURE NOTE    ATTENDING CLINICIAN:    Lanny Schwab MD    Fellow:  Mustapha Maurer MD    PREPROCEDURE DIAGNOSES:  1.  Cervical radiculopathy  2.  Chronic neck pain radiating to the upper extremity      PROCEDURE(S) PERFORMED:  1.  Interlaminar cervical epidural steroid injection  2.  Fluoroscopic guidance for the above-named procedure(s)      ANESTHESIA:  Local.    BLOOD LOSS:  Minimal.    DRAINS AND SPECIMENS:  None.    COMPLICATIONS:  None.    INDICATIONS:  Viral Guerin is a 51 year old male with a history of  chronic neck pain radiating to the upper extremity.      The patient stated that the patient was in their usual state of health and denied recent anticoagulant use or recent infections.  Therefore, the plan is for the above mentioned procedure.     Procedure Details:  The patient was met in the procedure room, where the patient was identified by name, medical record number and date of birth.  All of the patient s last minute questions were answered. Written informed consent was obtained and saved in the electronic medical record, after the risks, benefits, and alternatives were discussed with the patient.      A formal time-out procedure was performed, as per protocol, including patient name, title of procedure, and site of procedure, and all in the room concurred.  Routine monitors were applied.      The patient was placed in the prone position on the procedure room table.  All pressure points were checked and comfortably padded.  Routine monitors were placed.  Vital signs were stable.    A chlorhexidine prep was completed followed by sterile draping per standard procedure.     The AP fluoroscopic view was optimized for approach at  C7-T1 interspace.  The skin over the interspace was infiltrated with 4-5 mL of 1% Lidocaine using a 25 gauge, 1.5 inch needle.   A 22-gauge 3-1/2 inch Tuohy needle was advanced midline between C7-T1 interlaminar space using the loss of resistance technique with preservative free normal saline with fluoroscopic guidance. Mutiple AP, contralateral oblique, and lateral fluoroscopic images are taken as Tuohy needle was advanced to the epidural space. The epidural space was identified, without evidence of blood, cerebrospinal fluid, or parasthesia throughout. Needle tip placement within the epidural space was further confirmed with 1-2 mL of nonionic contrast agent, with the epidural space visualized in the AP, contralateral oblique, and lateral fluoroscopic view(s) with appropriate spread of the agent with fat vacuolization and no intravascular or intrathecal spread noted.  Next, 6 mL of a treatment solution containing 1 mL of preservative dexamethasone 10mg/ml, 1 mL 0.25% and 4 mL preservative free normal saline was administered. The needle was withdrawn.    Light pressure was held at the puncture site(s) to prevent ecchymosis and oozing.  The patient's skin was cleansed, and hemostasis was confirmed.  Band-aids were applied to the needle injection site(s).      Condition:    The patient remained awake and alert throughout the procedure.  The patient tolerated the procedure well and was monitored for approximately 15 minutes afterward in the post procedure area.  There were no immediate post procedure complications noted.  The patient was then discharged to home as per protocol.      Pre-procedure pain score: 8/10  Post-procedure pain score: 4/10

## 2022-03-01 NOTE — CONFIDENTIAL NOTE
Purpose of call: Follow up after Interlaminar Cervical Epidural Steroid Injection  Date of service: 1/20/22, 2/24/22  Spoke with: Patient    Location of pain: Neck  Percentage of pain relief after procedure: 0% relief from 1/20/22 S[facility], 0%  from 2/24/22 [facility] (Writer explained to patient the steroid can take up to 10 days to take full effect)  Duration of pain relief: NA    Follow up: 3/7/22 with NP      Kristen Jolly, RN

## 2022-03-23 ENCOUNTER — OFFICE VISIT (OUTPATIENT)
Dept: INFECTIOUS DISEASES | Facility: CLINIC | Age: 52
End: 2022-03-23
Attending: INTERNAL MEDICINE
Payer: COMMERCIAL

## 2022-03-23 DIAGNOSIS — L02.229 FURUNCLE OF PUBIC REGION: ICD-10-CM

## 2022-03-23 DIAGNOSIS — L02.92 FURUNCLE: ICD-10-CM

## 2022-03-23 DIAGNOSIS — Z21 HUMAN IMMUNODEFICIENCY VIRUS I INFECTION (H): Primary | ICD-10-CM

## 2022-03-23 DIAGNOSIS — N52.2 DRUG-INDUCED ERECTILE DYSFUNCTION: ICD-10-CM

## 2022-03-23 PROCEDURE — 99214 OFFICE O/P EST MOD 30 MIN: CPT | Performed by: INTERNAL MEDICINE

## 2022-03-23 RX ORDER — POLYMYXIN B SULFATE, BACITRACIN ZINC AND NEOMYCIN SULFATE 400; 3.5; 5 [USP'U]/G; MG/G; [USP'U]/G
1 OINTMENT TOPICAL
Qty: 90 TABLET | Refills: 3 | Status: SHIPPED | OUTPATIENT
Start: 2022-03-23

## 2022-03-23 RX ORDER — TADALAFIL 20 MG/1
20 TABLET ORAL DAILY PRN
Qty: 20 TABLET | Refills: 3 | Status: SHIPPED | OUTPATIENT
Start: 2022-03-23

## 2022-03-24 VITALS
SYSTOLIC BLOOD PRESSURE: 121 MMHG | HEART RATE: 81 BPM | BODY MASS INDEX: 31.48 KG/M2 | WEIGHT: 207.7 LBS | HEIGHT: 68 IN | DIASTOLIC BLOOD PRESSURE: 80 MMHG | OXYGEN SATURATION: 98 %

## 2022-03-24 ASSESSMENT — PAIN SCALES - GENERAL: PAINLEVEL: NO PAIN (0)

## 2022-03-28 NOTE — PROGRESS NOTES
Division of Infectious Diseases and International Medicine  Department of Medicine    [facility] VISIT NOTE [place] Mail Code [id number]  [address] [place] [address]  Office: [phone]  Fax:  [phone]     HISTORY OF PRESENT ILLNESS:    Viral Guerin is a 51 year old gentleman with asymptomatic HIV infection  (diagnosed 6/11/10) who returns today to [facility] for follow-up of his ongoing antiretroviral therapy comprised of Odefsey one tablet PO daily with breakfast  (switched to Odefsey from Atripla ~ 6/25/18; Atripla started in 7/10).  He has not missed an Odefsey dose in a very long time and reports no medication side effects.  He last had HIV monitoring labs drawn on 10/27/21 which looked good.  He has remained free of Covid-19 infection and received two Covid-19 vaccinations when he was visiting in [place] the summer of 2021, but has not yet received a booster vaccination.  He complains today of erectile dysfunction which started rather suddenly about a month or so ago.  He obtains an erection readily with his girlfriend partner, but becomes detumiescent during intromission before ejaculating.  He says that he is quite sure this was not a problem as recently as the beginning of this year.  At about the same time as this started, he was placed back on gabapentin for his cervical radiculopathy in 2/22 and also has been taking OTC ibuprofen up to 600 mg/day almost daily over the past month, but he has taken both of those medications previously intermittently during the past year for that discomfort without experiencing the erectile dysfunction previously, and he has not taken gabapentin at all for the past week (finding it to be of marginal benefit).  He did received an interlaminar cervical epidural steroid injection through [facility] on 2/24/22.  He does not feel there are any increased psychological or relationship stressors of late that might be causing the ED.  He  "also today complains of chronic intermittent difficulty with randomly occurring left axillary, bilateral inguinal, and left buttocks \"boils\" which he says have been occasionally present for the past four to five years.  The last occurred on his left buttocks about two weeks ago and before that he had a couple in his left infra-inguinal region.  They sometimes become quite large (the largest he has ever had was the size of a golf ball) and burst spontaneously producing pus.  He had one drained once in an urgent care clinic.  He is wondering how to eliminate them.  He continues to exercise regularly.  Beyond these issues, he has generally felt healthy in recent months and reports no new concerns or complaints today, including no recent febrile symptoms, EENT symptoms, cough, dyspnea, GI symptoms, other  symptoms, other skin issues, other myalgias / arthralgias (beyond his cervical radiculopathy which has been improved since the epidural steroid injection), headache or other neurological symptoms.     PAST MEDICAL HISTORY:  Past Medical History:   Diagnosis Date     Childhood asthma     Resolved.     Finger fracture, left ~ 1991.     Human immunodeficiency virus (HIV) disease (H) Diagnosed 6/11/10    CD4+ cell count never below 200, no AIDS sequelae.  Started therapy with Atripla 7/2/10.     Other chronic pain      CURRENT MEDICATIONS:  Current Outpatient Medications   Medication Sig Dispense Refill     albuterol (PROAIR HFA) 108 (90 Base) MCG/ACT inhaler Inhale 2 puffs into the lungs every 4 hours as needed for shortness of breath / dyspnea or wheezing 18 g 1     emtricitabine-rilpivirine-tenofovir (ODEFSEY) 200-25-25 MG TABS per tablet Take 1 tablet by mouth daily with food 90 tablet 3     Multiple Vitamin (ONE-A-DAY MENS) TABS Take  by mouth daily.       tadalafil (CIALIS) 20 MG tablet Take 1 tablet (20 mg) by mouth daily as needed 20 tablet 3     b complex vitamins (VITAMINS B COMPLEX) tablet [B COMPLEX VITAMINS " "(VITAMINS B COMPLEX) TABLET] Take by mouth daily.       ibuprofen (ADVIL,MOTRIN) 600 MG tablet [IBUPROFEN (ADVIL,MOTRIN) 600 MG TABLET] Take 1 tablet (600 mg total) by mouth every 6 (six) hours as needed. 28 tablet 0     SOCIAL HISTORY:  Social History     Socioeconomic History     Marital status: Single     Spouse name: Not on file     Number of children: Not on file     Years of education: Not on file     Highest education level: Not on file   Occupational History     Not on file   Tobacco Use     Smoking status: Current Every Day Smoker     Types: Cigarettes     Smokeless tobacco: Never Used   Substance and Sexual Activity     Alcohol use: Not Currently     Drug use: No     Sexual activity: Not on file   Other Topics Concern     Parent/sibling w/ CABG, MI or angioplasty before 65F 55M? Not Asked   Social History Narrative    ** Merged History Encounter **         Citizen of [place], moved to [place] in early 2010.  Lives in [facility].  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     Social Determinants of Health     Financial Resource Strain: Not on file   Food Insecurity: Not on file   Transportation Needs: Not on file   Physical Activity: Not on file   Stress: Not on file   Social Connections: Not on file   Intimate Partner Violence: Not on file   Housing Stability: Not on file   Sexually active with a female partner (insertive only) and practices safe sex with a condom.  He performs push-ups for exercise each morning and evening.  He now also goes to a gym twice a week and sometimes bikes around [place].  Quit smoking in ~ 3/19.    FAMILY HISTORY:  Family History   Problem Relation Age of Onset     Hypertension Other      Glaucoma Mother      REVIEW OF SYMPTOMS:  As per HPI.    PHYSICAL EXAMINATION:  General:  A pleasant, conversant, mildly obese, WDWN 51 year old man in no discomfort.  Vital signs:  /80   Pulse 81   Ht 1.715 m (5' 7.5\")   Wt " 94.2 kg (207 lb 11.2 oz)   SpO2 98%   BMI 32.05 kg/m   (weight is stable).  Skin:  Resolving, involuted, left buttocks and left groin small scabbed ulcers without tenderness or inflammation.  No other visible rash or lesion.  Head / Neck:  NCAT. External auditory canals are patent bilaterally without discharge.  PERRL.  EOMI.  Conjunctivae are pink without injection.  Sclerae are anicteric.  Oropharynx contains no erythema, exudate, or lesion.  Dentition is normal.  Neck is supple without thyromegaly or lymphadenopathy.  Chest:  Bilaterally clear to auscultation.  CV: RRR.  Normal S1, S2 without gallop, murmur or rub.  Abdomen: Bowel sounds active, soft, obese, nontender, no hepatosplenomegaly.  Back:  No current lumbar or CVA tenderness.  Extremities:  Distally warm, no edema.  Neuro: Alert, oriented, memory/cognition/affect normal.  Gait is normal.    LABORATORY STUDIES (Reviewed with the patient during his appointment today):      SARS CoV2 PCR 02/21/2022 Negative  Negative, Testing sent to reference lab. Results will be returned via unsolicited result Final    NEGATIVE: SARS-CoV-2 (COVID-19) RNA not detected, presumed negative.   Lab on 01/17/2022   Component Date Value Ref Range Status     SARS CoV2 PCR 01/17/2022 Negative  Negative, Testing sent to reference lab. Results will be returned via unsolicited result Final    NEGATIVE: SARS-CoV-2 (COVID-19) RNA not detected, presumed negative.     Lab on 02/21/2022   Component Date Value Ref Range Status     SARS CoV2 PCR 02/21/2022 Negative  Negative, Testing sent to reference lab. Results will be returned via unsolicited result Final    NEGATIVE: SARS-CoV-2 (COVID-19) RNA not detected, presumed negative.   Lab on 01/17/2022   Component Date Value Ref Range Status     SARS CoV2 PCR 01/17/2022 Negative  Negative, Testing sent to reference lab. Results will be returned via unsolicited result Final    NEGATIVE: SARS-CoV-2 (COVID-19) RNA not detected, presumed  negative.   Lab on 10/27/2021   Component Date Value Ref Range Status     Cholesterol 10/27/2021 186  <200 mg/dL Final     Triglycerides 10/27/2021 135  <150 mg/dL Final     Direct Measure HDL 10/27/2021 34 (A) >=40 mg/dL Final     LDL Cholesterol Calculated 10/27/2021 125 (A) <=100 mg/dL Final     Non HDL Cholesterol 10/27/2021 152 (A) <130 mg/dL Final     Patient Fasting > 8hrs? 10/27/2021 No   Final     Treponema Antibody Total 10/27/2021 Nonreactive  Nonreactive Final     Sodium 10/27/2021 140  133 - 144 mmol/L Final     Potassium 10/27/2021 4.2  3.4 - 5.3 mmol/L Final     Chloride 10/27/2021 110 (A) 94 - 109 mmol/L Final     Carbon Dioxide (CO2) 10/27/2021 28  20 - 32 mmol/L Final     Anion Gap 10/27/2021 2 (A) 3 - 14 mmol/L Final     Urea Nitrogen 10/27/2021 18  7 - 30 mg/dL Final     Creatinine 10/27/2021 1.00  0.66 - 1.25 mg/dL Final     Calcium 10/27/2021 9.6  8.5 - 10.1 mg/dL Final     Glucose 10/27/2021 76  70 - 99 mg/dL Final     Alkaline Phosphatase 10/27/2021 64  40 - 150 U/L Final     AST 10/27/2021 26  0 - 45 U/L Final     ALT 10/27/2021 44  0 - 70 U/L Final     Protein Total 10/27/2021 8.2  6.8 - 8.8 g/dL Final     Albumin 10/27/2021 4.3  3.4 - 5.0 g/dL Final     Bilirubin Total 10/27/2021 0.3  0.2 - 1.3 mg/dL Final     GFR Estimate 10/27/2021 88  >60 mL/min/1.73m2 Final    As of July 11, 2021, eGFR is calculated by the CKD-EPI creatinine equation, without race adjustment. eGFR can be influenced by muscle mass, exercise, and diet. The reported eGFR is an estimation only and is only applicable if the renal function is stable.     CD3% Total T Cells 10/27/2021 62  49 - 84 % Final     Absolute CD3, Total T Cells 10/27/2021 2,989  603-2,990 cells/uL Final     CD4% [place] T Cells 10/27/2021 21 (A) 28 - 63 % Final     Absolute CD4, [place] T Cells 10/27/2021 1,006  441-2,156 cells/uL Final     CD8% Suppressor T Cells 10/27/2021 40  10 - 40 % Final     Absolute CD8, Suppressor T Cells 10/27/2021 1,930 (A)  125-1,312 cells/uL Final     CD4:CD8 Ratio 10/27/2021 0.52 (A) 1.40 - 2.60 Final     HIV-1 RNA copies/mL 10/27/2021 Not Detected  Not Detected Copies/mL Final     WBC Count 10/27/2021 10.9  4.0 - 11.0 10e3/uL Final     RBC Count 10/27/2021 4.78  4.40 - 5.90 10e6/uL Final     Hemoglobin 10/27/2021 14.8  13.3 - 17.7 g/dL Final     Hematocrit 10/27/2021 44.1  40.0 - 53.0 % Final     MCV 10/27/2021 92  78 - 100 fL Final     MCH 10/27/2021 31.0  26.5 - 33.0 pg Final     MCHC 10/27/2021 33.6  31.5 - 36.5 g/dL Final     RDW 10/27/2021 12.8  10.0 - 15.0 % Final     Platelet Count 10/27/2021 220  150 - 450 10e3/uL Final     % Neutrophils 10/27/2021 47  % Final     % Lymphocytes 10/27/2021 45  % Final     % Monocytes 10/27/2021 6  % Final     % Eosinophils 10/27/2021 2  % Final     % Basophils 10/27/2021 0  % Final     % Immature Granulocytes 10/27/2021 0  % Final     NRBCs per 100 WBC 10/27/2021 0  <1 /100 Final     Absolute Neutrophils 10/27/2021 5.2  1.6 - 8.3 10e3/uL Final     Absolute Lymphocytes 10/27/2021 4.9  0.8 - 5.3 10e3/uL Final     Absolute Monocytes 10/27/2021 0.6  0.0 - 1.3 10e3/uL Final     Absolute Eosinophils 10/27/2021 0.2  0.0 - 0.7 10e3/uL Final     Absolute Basophils 10/27/2021 0.0  0.0 - 0.2 10e3/uL Final     Absolute Immature Granulocytes 10/27/2021 0.0  <=0.0 10e3/uL Final     Absolute NRBCs 10/27/2021 0.0  10e3/uL Final     IMPRESSION AND PLAN:  1. Well-controlled, stable, asymptomatic HIV infection:  On Odefsey he continues to have (as of 10/21) a normal absolute CD4+ cell count and an undetectable HIV plasma viral load with continued excellent dosing adherence and drug tolerance.  He will continue taking Odefsey and return to [facility] for follow-up in about seven months (with repeat annual HIV and drug toxicity laboratory studies at that time), or as needed before then.  The Odefsey prescription was renewed today.  2. Recent-onset erectile dysfunction:  The relatively rapid  development of this symptom in the absence of perceived changes in his mood or sexual relationship is a bit puzzling and would not seem likely to be due to senescence, hypotestosteronism, chronic disease (such as diabetes or vascular disease) because of the acuity.  Perhaps NSAID or gabapentin use might be a cause, although the history of past use of those medications without a problem would seem to contradict that hypothesis.  He will nevertheless switch to acetaminophen for pain control for the next several weeks.  It seems unlikely that his 2/24/22 cervical epidural steroid injection would cause the ED.  We discussed that sometimes this is a self-resolving mystery and he may self-resolve this issue over the next few months.  In the meantime, he will try Cialis 20 mg PO daily, prn, up to four times per week (dispense #20, 3 refills) -- we discussed dosing and possible side effects -- he will contact me if any concerns.  We will obtain a testosterone level with his next blood draw.  3. Sporadic left axillary, left buttocks, and bilateral inguinal furuncles:  There are no new or recent lesions to examine today, but by description they sound most consistent with staphylococcal furuncles.  They have been plaguing him longer than average for most people, but, while annoying, are generally tolerable.  We discussed that the natural history is eventual resolution in almost everyone over variable periods of time.  He is invited to present to clinic with a fresh lesion at some point the future, or, if one is drained elsewhere, he should request a culture of drainage be sent to determine if they are caused by an MRSA strain.  If so, we might consider an attempt at multi-modal decolonization sometime in the future.  No therapy seems indicated today, but he seemed to appreciate the discussion.  4. Stable obesity:  His BMI remains above thirty, although he has not gained any weight in the past three years.  I continue to  encourage ongoing attention to his diet and his regular aerobic exercise.  5. History of treated past recurrent urinary Chlamydia infections:  No current symptoms or recent exposure.  In the past, he has been treated with azithromycin / IM ceftriaxone here on 6/22/15 and 10/3/17 (with a positive 9/20/17 urine Chlamydia screen).  He had some mild ureteral discharge symptoms and a possible exposure in early 4/19, so was again presumptively treated with azithromycin / ceftriaxone once again then, but screens since 4/10/19 have been negative.  He has never tested positive for gonorrhea or syphilis at [facility] and has denied potential exposures in recent years.  6. Cervical radiculopathy due to neuroforaminal stenosis causing left arm pain:  [facility] and has now received three cervical epidural steroid injections, the most recent on 2/24/22 with considerable pain relief.  He recently discontinued taking gabapentin because he feels it does not provide much benefit compared to the epidural injections.  7. Occasionally mildly symptomatic asthma:  He uses an albuterol prn rescue inhaler infrequently.  8. Health Care Maintenance:   Per his report, he received two Covid-19 vaccinations when he was visiting in [place] in summer 2021 and plans to consider obtaining a booster dose (insufficient time today).  Influenza vaccine given 10/27/21.  Menactra vaccine given 11/25/15.  Received Tdap 2/12/14.  Received a Prevnar 13 vaccine 2/12/14 and Pneumovax on 8/13/14.  Consider starting a Shingrix series at next appointment.  HAV / HBV sero-immune on 6/18/15 post-vaccination screening.  HCV seronegative 6/28/11 and 5/16/16.  3/21/12 Quantiferon Gold assay negative.  Routine antitreponemal antibody screen negative most recently on 10/27/21.  Non-fasting lipids were still acceptable on 10/27/21 (total cholesterol 186, , HDL 34) with improved fasting triglycerides down to 135 -- will repeat annually --  again encourage increased aerobic exercise.  Creatinine (1.00) and blood pressure remain normal.  Urinalysis was benign on 7/23/19.  Underwent a right lower molar dental extraction in ~ 4/15; up to date with a dentist.  Having now turned 51, he is due for a routine screening colonoscopy -- will discuss at next appointment.

## 2022-05-18 NOTE — NURSING NOTE
"Chief Complaint   Patient presents with     Recheck Medication     Follow up B20     Vital signs:  Temp: 98.6  F (37  C)   BP: 122/78 Pulse: 90     SpO2: 96 %       Weight: 98.2 kg (216 lb 8 oz)  Estimated body mass index is 33.41 kg/m  as calculated from the following:    Height as of 4/10/19: 1.715 m (5' 7.5\").    Weight as of this encounter: 98.2 kg (216 lb 8 oz).        Shereen Hewitt, CMA    " 5-Fu Counseling: 5-Fluorouracil Counseling:  I discussed with the patient the risks of 5-fluorouracil including but not limited to erythema, scaling, itching, weeping, crusting, and pain.

## 2022-07-28 ENCOUNTER — TELEPHONE (OUTPATIENT)
Dept: INFECTIOUS DISEASES | Facility: CLINIC | Age: 52
End: 2022-07-28

## 2022-09-30 ENCOUNTER — TELEPHONE (OUTPATIENT)
Dept: PHARMACY | Facility: CLINIC | Age: 52
End: 2022-09-30

## 2022-09-30 NOTE — TELEPHONE ENCOUNTER
Pt is on hold currently. He went to [place] in April to help family until July. I don't think he returned in July. He is very compliant normally.     Marlene Calzada CPhT  [facility]  [phone]

## 2022-10-05 ENCOUNTER — TELEPHONE (OUTPATIENT)
Dept: PHARMACY | Facility: CLINIC | Age: 52
End: 2022-10-05

## 2022-10-05 NOTE — TELEPHONE ENCOUNTER
Called patient to schedule follow-up appt. No answer and phone went to busy signal. Unable to leave VM. Will discuss with Lanre, .    Domitila Davison, PharmD   Medication Therapy Management Pharmacist   October 5, 2022  [phone]

## 2022-11-02 ENCOUNTER — TELEPHONE (OUTPATIENT)
Dept: PHARMACY | Facility: CLINIC | Age: 52
End: 2022-11-02

## 2022-11-02 NOTE — TELEPHONE ENCOUNTER
Attempted to call patient at both phone numbers on file to schedule ID appointment. Busy signal at both numbers - phones do not appear to be in service.     Domitila Davison, PharmD   Medication Therapy Management Pharmacist   November 2, 2022  [phone]

## (undated) DEVICE — PREP CHLORAPREP W/ORANGE TINT 10.5ML 260715

## (undated) DEVICE — GLOVE PROTEXIS POWDER FREE SMT 7.0  2D72PT70X

## (undated) DEVICE — SYR 10ML PERFIX LL 332152

## (undated) DEVICE — TRAY PAIN INJECTION 97A 640

## (undated) DEVICE — NDL EPIDURAL TUOHY 20GA 3.5" 405028

## (undated) RX ORDER — DIAZEPAM 5 MG
TABLET ORAL
Status: DISPENSED
Start: 2022-02-24